# Patient Record
Sex: FEMALE | Race: WHITE | NOT HISPANIC OR LATINO | Employment: UNEMPLOYED | URBAN - METROPOLITAN AREA
[De-identification: names, ages, dates, MRNs, and addresses within clinical notes are randomized per-mention and may not be internally consistent; named-entity substitution may affect disease eponyms.]

---

## 2017-01-20 ENCOUNTER — TELEPHONE (OUTPATIENT)
Dept: INTERNAL MEDICINE | Facility: CLINIC | Age: 57
End: 2017-01-20

## 2017-01-20 NOTE — TELEPHONE ENCOUNTER
----- Message from Nydia Hameed sent at 1/20/2017 11:25 AM EST -----  Contact: BECKA ZAYAS PH:[phone]  BECKA ZAYAS CALLING FOR A REFILL FOR HER PERCOCET. SHE CAN BE REACHED [phone]

## 2017-01-23 RX ORDER — OXYCODONE AND ACETAMINOPHEN 7.5; 325 MG/1; MG/1
1 TABLET ORAL 4 TIMES DAILY PRN
Qty: 120 TABLET | Refills: 0 | Status: SHIPPED | OUTPATIENT
Start: 2017-01-23 | End: 2017-02-23 | Stop reason: SDUPTHER

## 2017-02-01 ENCOUNTER — TELEPHONE (OUTPATIENT)
Dept: INTERNAL MEDICINE | Facility: CLINIC | Age: 57
End: 2017-02-01

## 2017-02-01 ENCOUNTER — OFFICE VISIT (OUTPATIENT)
Dept: INTERNAL MEDICINE | Facility: CLINIC | Age: 57
End: 2017-02-01

## 2017-02-01 VITALS
HEART RATE: 64 BPM | RESPIRATION RATE: 16 BRPM | SYSTOLIC BLOOD PRESSURE: 130 MMHG | BODY MASS INDEX: 34.54 KG/M2 | DIASTOLIC BLOOD PRESSURE: 88 MMHG | WEIGHT: 195 LBS

## 2017-02-01 DIAGNOSIS — J06.9 URTI (ACUTE UPPER RESPIRATORY INFECTION): Primary | ICD-10-CM

## 2017-02-01 PROCEDURE — 96372 THER/PROPH/DIAG INJ SC/IM: CPT | Performed by: INTERNAL MEDICINE

## 2017-02-01 PROCEDURE — 99214 OFFICE O/P EST MOD 30 MIN: CPT | Performed by: INTERNAL MEDICINE

## 2017-02-01 RX ORDER — FLUCONAZOLE 150 MG/1
150 TABLET ORAL ONCE
Qty: 2 TABLET | Refills: 1 | Status: SHIPPED | OUTPATIENT
Start: 2017-02-01 | End: 2017-02-01

## 2017-02-01 RX ORDER — DEXAMETHASONE SODIUM PHOSPHATE 4 MG/ML
4 INJECTION, SOLUTION INTRA-ARTICULAR; INTRALESIONAL; INTRAMUSCULAR; INTRAVENOUS; SOFT TISSUE ONCE
Status: COMPLETED | OUTPATIENT
Start: 2017-02-01 | End: 2017-02-01

## 2017-02-01 RX ORDER — AMOXICILLIN AND CLAVULANATE POTASSIUM 875; 125 MG/1; MG/1
1 TABLET, FILM COATED ORAL 2 TIMES DAILY
Qty: 14 TABLET | Refills: 0 | Status: SHIPPED | OUTPATIENT
Start: 2017-02-01 | End: 2017-02-23

## 2017-02-01 RX ADMIN — DEXAMETHASONE SODIUM PHOSPHATE 4 MG: 4 INJECTION, SOLUTION INTRA-ARTICULAR; INTRALESIONAL; INTRAMUSCULAR; INTRAVENOUS; SOFT TISSUE at 15:49

## 2017-02-01 NOTE — TELEPHONE ENCOUNTER
Diflucan 150 mg PO Daily X 1 dose wait 72 hours and if symptoms have not resolved take second dose. #2 with 1 refill.

## 2017-02-01 NOTE — PROGRESS NOTES
"Subjective   Lauren Womack is a 56 y.o. female.   Pt is here with the acute complaint of URTI.           History of Present Illness   Pt states she has had URTI symptoms for 2 weeks. She has tried all OTC products including antihistamines, cough syrup and mucinex with non relief. She denies fever or chills. She admits to sinus congestion and drainage. She also states in the am she has noticed wheezing, as the day goes by she feels like her \"lungs clear out\" she feels all sinus congestion is \"draining into her lungs\" when she sleeps.           The following portions of the patient's history were reviewed and updated as appropriate: allergies, current medications, past family history, past medical history, past social history, past surgical history and problem list.         Review of Systems   Constitutional: Negative.    HENT:        See HPI.    Eyes: Negative.    Respiratory: Negative.    Cardiovascular: Negative.    Gastrointestinal: Negative.    Endocrine: Negative.    Genitourinary: Negative.    Musculoskeletal: Negative.    Skin: Negative.    Allergic/Immunologic: Negative.    Neurological: Negative.    Hematological: Negative.    Psychiatric/Behavioral: Negative.        Objective   Physical Exam   Constitutional: She is oriented to person, place, and time. She appears well-developed and well-nourished.   HENT:   Head: Normocephalic and atraumatic.   Nose: Nose normal.   Bilateral ear effusions.   Moderate oropharyngeal drainage with mild erythema.   Tenderness to palpation of maxillary sinuses.    Eyes: Conjunctivae and EOM are normal. Pupils are equal, round, and reactive to light.   Neck: Normal range of motion. Neck supple.   Cardiovascular: Normal rate, regular rhythm, normal heart sounds and intact distal pulses.    Pulmonary/Chest: Effort normal and breath sounds normal.   Occasional wheeze.   Neurological: She is alert and oriented to person, place, and time. She has normal reflexes.   Skin: Skin is " warm and dry.   Psychiatric: She has a normal mood and affect.   Nursing note and vitals reviewed.      Assessment/Plan      1.) Augmentin 875 mg PO BID X 7 days with no refills.   2.) Decadron 4 mg IM X 1 dose.   3.) Keep scheduled follow up   4.) 15 minutes spent on exam, 10 minutes spent counseling on new medications-how to dose and possible s/e.       * Total time spent in discussion and exam 25 minutes.

## 2017-02-01 NOTE — TELEPHONE ENCOUNTER
----- Message from Cesia Turcios sent at 2/1/2017  4:16 PM EST -----  Contact: Patient  Patient was just here and got rx for Augmentin.  Forgot that anytime she is on antibiotic she gets bad yeast infection and usually needs 2 Diflucan.  Wants to know if we can call this in to [facility] in [place].

## 2017-02-23 ENCOUNTER — OFFICE VISIT (OUTPATIENT)
Dept: INTERNAL MEDICINE | Facility: CLINIC | Age: 57
End: 2017-02-23

## 2017-02-23 VITALS — RESPIRATION RATE: 16 BRPM | WEIGHT: 195 LBS | HEART RATE: 64 BPM | BODY MASS INDEX: 34.54 KG/M2

## 2017-02-23 DIAGNOSIS — G47.09 OTHER INSOMNIA: ICD-10-CM

## 2017-02-23 DIAGNOSIS — M54.50 CHRONIC LOW BACK PAIN WITHOUT SCIATICA, UNSPECIFIED BACK PAIN LATERALITY: ICD-10-CM

## 2017-02-23 DIAGNOSIS — K58.9 IRRITABLE BOWEL SYNDROME WITHOUT DIARRHEA: ICD-10-CM

## 2017-02-23 DIAGNOSIS — M79.7 FIBROMYALGIA: ICD-10-CM

## 2017-02-23 DIAGNOSIS — G43.009 NONINTRACTABLE MIGRAINE, UNSPECIFIED MIGRAINE TYPE: ICD-10-CM

## 2017-02-23 DIAGNOSIS — M77.8 TENDONITIS OF FINGER: ICD-10-CM

## 2017-02-23 DIAGNOSIS — E78.5 HYPERLIPIDEMIA, UNSPECIFIED HYPERLIPIDEMIA TYPE: Primary | ICD-10-CM

## 2017-02-23 DIAGNOSIS — G89.29 CHRONIC LOW BACK PAIN WITHOUT SCIATICA, UNSPECIFIED BACK PAIN LATERALITY: ICD-10-CM

## 2017-02-23 PROBLEM — J06.9 URTI (ACUTE UPPER RESPIRATORY INFECTION): Status: RESOLVED | Noted: 2017-02-01 | Resolved: 2017-02-23

## 2017-02-23 LAB
ALBUMIN SERPL-MCNC: 4.7 G/DL (ref 3.2–4.8)
ALBUMIN/GLOB SERPL: 1.5 G/DL (ref 1.5–2.5)
ALP SERPL-CCNC: 51 U/L (ref 25–100)
ALT SERPL W P-5'-P-CCNC: 16 U/L (ref 7–40)
ANION GAP SERPL CALCULATED.3IONS-SCNC: 13 MMOL/L (ref 3–11)
ARTICHOKE IGE QN: 183 MG/DL (ref 0–130)
AST SERPL-CCNC: 27 U/L (ref 0–33)
BILIRUB SERPL-MCNC: 0.2 MG/DL (ref 0.3–1.2)
BUN BLD-MCNC: 9 MG/DL (ref 9–23)
BUN/CREAT SERPL: 15 (ref 7–25)
CALCIUM SPEC-SCNC: 9.8 MG/DL (ref 8.7–10.4)
CHLORIDE SERPL-SCNC: 105 MMOL/L (ref 99–109)
CHOLEST SERPL-MCNC: 248 MG/DL (ref 0–200)
CO2 SERPL-SCNC: 21 MMOL/L (ref 20–31)
CREAT BLD-MCNC: 0.6 MG/DL (ref 0.6–1.3)
DEPRECATED RDW RBC AUTO: 45.1 FL (ref 37–54)
ERYTHROCYTE [DISTWIDTH] IN BLOOD BY AUTOMATED COUNT: 13.6 % (ref 11.3–14.5)
GFR SERPL CREATININE-BSD FRML MDRD: 104 ML/MIN/1.73
GLOBULIN UR ELPH-MCNC: 3.2 GM/DL
GLUCOSE BLD-MCNC: 90 MG/DL (ref 70–100)
HCT VFR BLD AUTO: 43.8 % (ref 34.5–44)
HDLC SERPL-MCNC: 59 MG/DL (ref 40–60)
HGB BLD-MCNC: 14.2 G/DL (ref 11.5–15.5)
MCH RBC QN AUTO: 29.2 PG (ref 27–31)
MCHC RBC AUTO-ENTMCNC: 32.4 G/DL (ref 32–36)
MCV RBC AUTO: 90.1 FL (ref 80–99)
PLATELET # BLD AUTO: 304 10*3/MM3 (ref 150–450)
PMV BLD AUTO: 11.8 FL (ref 6–12)
POTASSIUM BLD-SCNC: 4.3 MMOL/L (ref 3.5–5.5)
PROT SERPL-MCNC: 7.9 G/DL (ref 5.7–8.2)
RBC # BLD AUTO: 4.86 10*6/MM3 (ref 3.89–5.14)
SODIUM BLD-SCNC: 139 MMOL/L (ref 132–146)
TRIGL SERPL-MCNC: 152 MG/DL (ref 0–150)
WBC NRBC COR # BLD: 8.81 10*3/MM3 (ref 3.5–10.8)

## 2017-02-23 PROCEDURE — 80053 COMPREHEN METABOLIC PANEL: CPT | Performed by: INTERNAL MEDICINE

## 2017-02-23 PROCEDURE — 36415 COLL VENOUS BLD VENIPUNCTURE: CPT | Performed by: INTERNAL MEDICINE

## 2017-02-23 PROCEDURE — 80061 LIPID PANEL: CPT | Performed by: INTERNAL MEDICINE

## 2017-02-23 PROCEDURE — 85027 COMPLETE CBC AUTOMATED: CPT | Performed by: INTERNAL MEDICINE

## 2017-02-23 PROCEDURE — 99214 OFFICE O/P EST MOD 30 MIN: CPT | Performed by: INTERNAL MEDICINE

## 2017-02-23 RX ORDER — TRIAMCINOLONE ACETONIDE 55 UG/1
2 SPRAY, METERED NASAL DAILY
Qty: 1 BOTTLE | Refills: 5 | Status: SHIPPED | OUTPATIENT
Start: 2017-02-23

## 2017-02-23 RX ORDER — CARISOPRODOL 250 MG/1
1 TABLET ORAL DAILY PRN
Qty: 30 TABLET | Refills: 0 | Status: SHIPPED | OUTPATIENT
Start: 2017-02-23 | End: 2017-06-01 | Stop reason: SDUPTHER

## 2017-02-23 RX ORDER — PREGABALIN 225 MG/1
225 CAPSULE ORAL 2 TIMES DAILY
Qty: 60 CAPSULE | Refills: 3 | Status: SHIPPED | OUTPATIENT
Start: 2017-02-23 | End: 2017-04-21 | Stop reason: SDUPTHER

## 2017-02-23 RX ORDER — TRIAMCINOLONE ACETONIDE 55 UG/1
2 SPRAY, METERED NASAL DAILY
COMMUNITY
End: 2017-02-23 | Stop reason: SDUPTHER

## 2017-02-23 RX ORDER — OXYCODONE AND ACETAMINOPHEN 7.5; 325 MG/1; MG/1
1 TABLET ORAL 4 TIMES DAILY PRN
Qty: 120 TABLET | Refills: 0 | Status: SHIPPED | OUTPATIENT
Start: 2017-02-23 | End: 2017-03-22 | Stop reason: SDUPTHER

## 2017-02-23 RX ORDER — CLONAZEPAM 1 MG/1
1 TABLET ORAL NIGHTLY PRN
Qty: 30 TABLET | Refills: 0 | Status: SHIPPED | OUTPATIENT
Start: 2017-02-23 | End: 2017-04-21 | Stop reason: SDUPTHER

## 2017-02-23 NOTE — PROGRESS NOTES
Subjective   Lauren Womack is a 56 y.o. female.   Pt is here to follow up on fibromyalgia,insomnia,HPL,migraines,IBS,and chronic low back pain.          History of Present Illness   Pt is here to follow up on fibromyalgia,insomnia,HPL,migraines,IBS,and chronic low back pain. She states that all chronic issues are doing well. She does request to switch her Flexeril to a different muscle relaxer, she only takes this once daily when needed. In the past Soma worked the best and did not make her sleepy during the day.   She has noticed over the last week she had developed a pain in her left thumb especially when trying to pick things up, she states when she tries lift an object or twist a lid she will almost drop whatever is in her hand due to the sharp pain. She denies repetitive activity with her left hand.               The following portions of the patient's history were reviewed and updated as appropriate: allergies, current medications, past family history, past medical history, past social history, past surgical history and problem list.             Review of Systems   Constitutional: Negative.    HENT: Negative.    Eyes: Negative.    Respiratory: Negative.    Cardiovascular:        See HPI.    Gastrointestinal:        See HPI.    Endocrine: Negative.    Genitourinary: Negative.    Musculoskeletal:        See HPI.    Skin: Negative.    Allergic/Immunologic: Negative.    Neurological:        See HPI.    Hematological: Negative.    Psychiatric/Behavioral:        See HPI.               Objective   Physical Exam   Constitutional: She is oriented to person, place, and time. She appears well-developed and well-nourished.   HENT:   Head: Normocephalic and atraumatic.   Right Ear: External ear normal.   Left Ear: External ear normal.   Nose: Nose normal.   Mouth/Throat: Oropharynx is clear and moist.   Eyes: Conjunctivae and EOM are normal. Pupils are equal, round, and reactive to light.   Neck: Normal range of motion.  Neck supple. No tracheal deviation present. No thyromegaly present.   Cardiovascular: Normal rate, regular rhythm, normal heart sounds and intact distal pulses.    Pulmonary/Chest: Effort normal and breath sounds normal.   Abdominal: Soft. Bowel sounds are normal. She exhibits no distension. There is no tenderness.   Neurological: She is alert and oriented to person, place, and time. She has normal reflexes.   Skin: Skin is warm and dry.   Psychiatric: She has a normal mood and affect.   Nursing note and vitals reviewed.              Assessment/Plan     Hyperlipidemia, unspecified hyperlipidemia type  -     Comprehensive Metabolic Panel  -     Lipid Panel  -     CBC (No Diff)    Fibromyalgia  -     pregabalin (LYRICA) 225 MG capsule; Take 1 capsule by mouth 2 (Two) Times a Day.    Other insomnia  -     clonazePAM (KlonoPIN) 1 MG tablet; Take 1 tablet by mouth At Night As Needed for seizures.    Nonintractable migraine, unspecified migraine type    Irritable bowel syndrome without diarrhea    Chronic low back pain without sciatica, unspecified back pain laterality    Tendonitis of finger    Other orders  -     oxyCODONE-acetaminophen (PERCOCET) 7.5-325 MG per tablet; Take 1 tablet by mouth 4 (Four) Times a Day As Needed for moderate pain (4-6).  -     Triamcinolone Acetonide (NASACORT AQ) 55 MCG/ACT nasal inhaler; 2 sprays into each nostril Daily.  -     carisoprodol (SOMA) 250 MG tablet; Take 1 tablet by mouth Daily As Needed for muscle spasms.          1.) CBC,CMP,lipid panel   2.) Will do UDS at next visit ( It cost $50 every time, her last was in 11/2016, all have been optimal).   3.) Stop flexeril, start Soma 250 mg PO daily prn, how to dose and possible s/e discussed.   4.) left hand brace for tendonitis to left thumb.   5.) Follow up in 3 months

## 2017-02-24 RX ORDER — ATORVASTATIN CALCIUM 20 MG/1
20 TABLET, FILM COATED ORAL DAILY
Qty: 30 TABLET | Refills: 3 | Status: SHIPPED | OUTPATIENT
Start: 2017-02-24 | End: 2017-09-18 | Stop reason: SDUPTHER

## 2017-03-20 RX ORDER — RIZATRIPTAN BENZOATE 10 MG/1
TABLET ORAL
Qty: 10 TABLET | Refills: 0 | Status: SHIPPED | OUTPATIENT
Start: 2017-03-20 | End: 2017-04-18 | Stop reason: SDUPTHER

## 2017-03-23 RX ORDER — OXYCODONE AND ACETAMINOPHEN 7.5; 325 MG/1; MG/1
1 TABLET ORAL 4 TIMES DAILY PRN
Qty: 120 TABLET | Refills: 0 | Status: SHIPPED | OUTPATIENT
Start: 2017-03-23 | End: 2017-04-21 | Stop reason: SDUPTHER

## 2017-04-06 DIAGNOSIS — Z00.00 HEALTH CARE MAINTENANCE: ICD-10-CM

## 2017-04-13 DIAGNOSIS — J06.9 URTI (ACUTE UPPER RESPIRATORY INFECTION): Primary | ICD-10-CM

## 2017-04-13 RX ORDER — FLUCONAZOLE 150 MG/1
150 TABLET ORAL ONCE
Qty: 1 TABLET | Refills: 1 | Status: SHIPPED | OUTPATIENT
Start: 2017-04-13 | End: 2017-04-13

## 2017-04-13 RX ORDER — AZITHROMYCIN 250 MG/1
TABLET, FILM COATED ORAL
Qty: 6 TABLET | Refills: 0 | Status: SHIPPED | OUTPATIENT
Start: 2017-04-13 | End: 2017-06-01

## 2017-04-17 ENCOUNTER — TELEPHONE (OUTPATIENT)
Dept: INTERNAL MEDICINE | Facility: CLINIC | Age: 57
End: 2017-04-17

## 2017-04-17 DIAGNOSIS — Z00.00 HEALTH CARE MAINTENANCE: ICD-10-CM

## 2017-04-17 RX ORDER — ESTRADIOL 0.1 MG/D
1 FILM, EXTENDED RELEASE TRANSDERMAL 2 TIMES WEEKLY
Qty: 24 PATCH | Refills: 2 | Status: SHIPPED | OUTPATIENT
Start: 2017-04-17 | End: 2017-07-16

## 2017-04-17 NOTE — TELEPHONE ENCOUNTER
----- Message from Amy Blanco sent at 4/17/2017  2:56 PM EDT -----  PT CALLING  STATED THAT HAS NOT RECEIVED  VIVELLE. 0.1MG   PATCH.  WANTING   3 MTH SUPPLY  STATED THAT SWITCHED TO NEW PHARMACY MAIL ORDER AND  NEED VERIFICATION FROM     SHE CAN BE REACHED [phone]    [facility] MAIL ORDER

## 2017-04-18 RX ORDER — RIZATRIPTAN BENZOATE 10 MG/1
TABLET ORAL
Qty: 10 TABLET | Refills: 0 | Status: SHIPPED | OUTPATIENT
Start: 2017-04-18 | End: 2017-05-22 | Stop reason: SDUPTHER

## 2017-04-21 DIAGNOSIS — M79.7 FIBROMYALGIA: ICD-10-CM

## 2017-04-21 DIAGNOSIS — G47.09 OTHER INSOMNIA: ICD-10-CM

## 2017-04-21 RX ORDER — PREGABALIN 225 MG/1
225 CAPSULE ORAL 2 TIMES DAILY
Qty: 60 CAPSULE | Refills: 3 | Status: SHIPPED | OUTPATIENT
Start: 2017-04-21 | End: 2017-06-01 | Stop reason: SDUPTHER

## 2017-04-21 RX ORDER — OXYCODONE AND ACETAMINOPHEN 7.5; 325 MG/1; MG/1
1 TABLET ORAL 4 TIMES DAILY PRN
Qty: 120 TABLET | Refills: 0 | Status: SHIPPED | OUTPATIENT
Start: 2017-04-21 | End: 2017-05-23 | Stop reason: SDUPTHER

## 2017-04-21 RX ORDER — CLONAZEPAM 1 MG/1
1 TABLET ORAL NIGHTLY PRN
Qty: 30 TABLET | Refills: 0 | Status: SHIPPED | OUTPATIENT
Start: 2017-04-21 | End: 2017-06-08 | Stop reason: SDUPTHER

## 2017-04-21 NOTE — TELEPHONE ENCOUNTER
----- Message from Amy Blanco sent at 4/21/2017 11:09 AM EDT -----  PT CALLING  FOR RX REFILL OF PERCOCET. WHICH SHE STATED THAT SHE DID NOT NEED UNTIL Monday(4/24/17)   ALSO, RX REFILL OF LYRICA AND CLONAZEPAM    CONTACT [phone]    PHARM [facility] [address] IN [place]

## 2017-05-23 ENCOUNTER — TELEPHONE (OUTPATIENT)
Dept: INTERNAL MEDICINE | Facility: CLINIC | Age: 57
End: 2017-05-23

## 2017-05-23 RX ORDER — OXYCODONE AND ACETAMINOPHEN 7.5; 325 MG/1; MG/1
1 TABLET ORAL 4 TIMES DAILY PRN
Qty: 120 TABLET | Refills: 0 | Status: SHIPPED | OUTPATIENT
Start: 2017-05-23 | End: 2017-06-01 | Stop reason: SDUPTHER

## 2017-05-23 RX ORDER — RIZATRIPTAN BENZOATE 10 MG/1
TABLET ORAL
Qty: 9 TABLET | Refills: 0 | Status: SHIPPED | OUTPATIENT
Start: 2017-05-23 | End: 2017-06-01 | Stop reason: SDUPTHER

## 2017-06-01 ENCOUNTER — OFFICE VISIT (OUTPATIENT)
Dept: INTERNAL MEDICINE | Facility: CLINIC | Age: 57
End: 2017-06-01

## 2017-06-01 VITALS
SYSTOLIC BLOOD PRESSURE: 128 MMHG | HEART RATE: 70 BPM | RESPIRATION RATE: 18 BRPM | DIASTOLIC BLOOD PRESSURE: 76 MMHG | BODY MASS INDEX: 32.99 KG/M2 | OXYGEN SATURATION: 96 % | WEIGHT: 186.2 LBS | HEIGHT: 63 IN

## 2017-06-01 DIAGNOSIS — E78.5 HYPERLIPIDEMIA, UNSPECIFIED HYPERLIPIDEMIA TYPE: Primary | ICD-10-CM

## 2017-06-01 DIAGNOSIS — K58.9 IRRITABLE BOWEL SYNDROME WITHOUT DIARRHEA: ICD-10-CM

## 2017-06-01 DIAGNOSIS — G47.09 OTHER INSOMNIA: ICD-10-CM

## 2017-06-01 DIAGNOSIS — M79.7 FIBROMYALGIA: ICD-10-CM

## 2017-06-01 DIAGNOSIS — F41.9 ANXIETY: ICD-10-CM

## 2017-06-01 DIAGNOSIS — M54.50 CHRONIC LOW BACK PAIN WITHOUT SCIATICA, UNSPECIFIED BACK PAIN LATERALITY: ICD-10-CM

## 2017-06-01 DIAGNOSIS — G89.29 CHRONIC LOW BACK PAIN WITHOUT SCIATICA, UNSPECIFIED BACK PAIN LATERALITY: ICD-10-CM

## 2017-06-01 LAB
ALBUMIN SERPL-MCNC: 4.6 G/DL (ref 3.2–4.8)
ALBUMIN/GLOB SERPL: 1.5 G/DL (ref 1.5–2.5)
ALP SERPL-CCNC: 48 U/L (ref 25–100)
ALT SERPL W P-5'-P-CCNC: 14 U/L (ref 7–40)
ANION GAP SERPL CALCULATED.3IONS-SCNC: 3 MMOL/L (ref 3–11)
ARTICHOKE IGE QN: 122 MG/DL (ref 0–130)
AST SERPL-CCNC: 23 U/L (ref 0–33)
BILIRUB SERPL-MCNC: 0.4 MG/DL (ref 0.3–1.2)
BUN BLD-MCNC: 8 MG/DL (ref 9–23)
BUN/CREAT SERPL: 11.4 (ref 7–25)
CALCIUM SPEC-SCNC: 9.9 MG/DL (ref 8.7–10.4)
CHLORIDE SERPL-SCNC: 105 MMOL/L (ref 99–109)
CHOLEST SERPL-MCNC: 183 MG/DL (ref 0–200)
CO2 SERPL-SCNC: 30 MMOL/L (ref 20–31)
CREAT BLD-MCNC: 0.7 MG/DL (ref 0.6–1.3)
DEPRECATED RDW RBC AUTO: 47 FL (ref 37–54)
ERYTHROCYTE [DISTWIDTH] IN BLOOD BY AUTOMATED COUNT: 14.1 % (ref 11.3–14.5)
GFR SERPL CREATININE-BSD FRML MDRD: 87 ML/MIN/1.73
GLOBULIN UR ELPH-MCNC: 3 GM/DL
GLUCOSE BLD-MCNC: 64 MG/DL (ref 70–100)
HCT VFR BLD AUTO: 43.4 % (ref 34.5–44)
HDLC SERPL-MCNC: 55 MG/DL (ref 40–60)
HGB BLD-MCNC: 13.8 G/DL (ref 11.5–15.5)
MCH RBC QN AUTO: 28.9 PG (ref 27–31)
MCHC RBC AUTO-ENTMCNC: 31.8 G/DL (ref 32–36)
MCV RBC AUTO: 91 FL (ref 80–99)
PLATELET # BLD AUTO: 287 10*3/MM3 (ref 150–450)
PMV BLD AUTO: 11.9 FL (ref 6–12)
POTASSIUM BLD-SCNC: 4.3 MMOL/L (ref 3.5–5.5)
PROT SERPL-MCNC: 7.6 G/DL (ref 5.7–8.2)
RBC # BLD AUTO: 4.77 10*6/MM3 (ref 3.89–5.14)
SODIUM BLD-SCNC: 138 MMOL/L (ref 132–146)
TRIGL SERPL-MCNC: 105 MG/DL (ref 0–150)
WBC NRBC COR # BLD: 8.38 10*3/MM3 (ref 3.5–10.8)

## 2017-06-01 PROCEDURE — 80053 COMPREHEN METABOLIC PANEL: CPT | Performed by: INTERNAL MEDICINE

## 2017-06-01 PROCEDURE — 80061 LIPID PANEL: CPT | Performed by: INTERNAL MEDICINE

## 2017-06-01 PROCEDURE — 85027 COMPLETE CBC AUTOMATED: CPT | Performed by: INTERNAL MEDICINE

## 2017-06-01 PROCEDURE — 99214 OFFICE O/P EST MOD 30 MIN: CPT | Performed by: INTERNAL MEDICINE

## 2017-06-01 RX ORDER — PREGABALIN 225 MG/1
225 CAPSULE ORAL 2 TIMES DAILY
Qty: 60 CAPSULE | Refills: 3 | Status: SHIPPED | OUTPATIENT
Start: 2017-06-01 | End: 2017-09-18 | Stop reason: SDUPTHER

## 2017-06-01 RX ORDER — FLUOXETINE 10 MG/1
10 CAPSULE ORAL DAILY
Qty: 30 CAPSULE | Refills: 3 | Status: SHIPPED | OUTPATIENT
Start: 2017-06-01 | End: 2017-07-14 | Stop reason: SINTOL

## 2017-06-01 RX ORDER — PROMETHAZINE HYDROCHLORIDE 12.5 MG/1
12.5 TABLET ORAL EVERY 8 HOURS PRN
Qty: 30 TABLET | Refills: 1 | Status: SHIPPED | OUTPATIENT
Start: 2017-06-01

## 2017-06-01 RX ORDER — CELECOXIB 200 MG/1
200 CAPSULE ORAL DAILY
Qty: 60 CAPSULE | Refills: 4 | Status: SHIPPED | OUTPATIENT
Start: 2017-06-01 | End: 2017-09-18 | Stop reason: SDUPTHER

## 2017-06-01 RX ORDER — OXYCODONE AND ACETAMINOPHEN 7.5; 325 MG/1; MG/1
1 TABLET ORAL 4 TIMES DAILY PRN
Qty: 120 TABLET | Refills: 0 | Status: SHIPPED | OUTPATIENT
Start: 2017-06-01 | End: 2017-07-03 | Stop reason: SDUPTHER

## 2017-06-01 RX ORDER — CARISOPRODOL 250 MG/1
1 TABLET ORAL DAILY PRN
Qty: 30 TABLET | Refills: 0 | Status: SHIPPED | OUTPATIENT
Start: 2017-06-01 | End: 2018-01-03 | Stop reason: SDUPTHER

## 2017-06-01 RX ORDER — RIZATRIPTAN BENZOATE 10 MG/1
10 TABLET ORAL ONCE AS NEEDED
Qty: 9 TABLET | Refills: 1 | Status: SHIPPED | OUTPATIENT
Start: 2017-06-01 | End: 2017-09-18 | Stop reason: SDUPTHER

## 2017-06-01 NOTE — PROGRESS NOTES
Subjective   Lauren Womack is a 56 y.o. female.   Pt is here to follow up on HPL,IBS,chronic low back pain,fibromyalgia and insomnia.       History of Present Illness   Pt is here to follow up on HPL,IBS,chronic low back pain,fibromyalgia and insomnia.   Pt states that all chronic issues are doing well.   She has noticed as of late that she has been having more panic attacks during the day, she does not take anything daily for anxiety but does take klonopin at night prn for sleep, she does not want to use this during the day if she can help it. Years ago she tried Buspar and Prozac both worked for panic attacks.     The following portions of the patient's history were reviewed and updated as appropriate: allergies, current medications, past family history, past medical history, past social history, past surgical history and problem list.          Review of Systems   Cardiovascular:        See HPI.    Gastrointestinal:        See HPI.    Endocrine: Negative.    Genitourinary: Negative.    Musculoskeletal:        See HPI.    Skin: Negative.    Allergic/Immunologic: Negative.    Neurological: Negative.    Hematological: Negative.    Psychiatric/Behavioral:        See HPI.               Objective   Physical Exam   Constitutional: She is oriented to person, place, and time. She appears well-developed and well-nourished.   HENT:   Head: Normocephalic and atraumatic.   Right Ear: External ear normal.   Left Ear: External ear normal.   Nose: Nose normal.   Mouth/Throat: Oropharynx is clear and moist.   Eyes: Conjunctivae and EOM are normal. Pupils are equal, round, and reactive to light.   Neck: Normal range of motion. Neck supple. No tracheal deviation present. No thyromegaly present.   Cardiovascular: Normal rate, regular rhythm, normal heart sounds and intact distal pulses.    Pulmonary/Chest: Effort normal and breath sounds normal.   Abdominal: Soft. Bowel sounds are normal. She exhibits no distension. There is no  tenderness.   Neurological: She is alert and oriented to person, place, and time. She has normal reflexes.   Skin: Skin is warm and dry.   Psychiatric: She has a normal mood and affect.   Nursing note and vitals reviewed.             Assessment/Plan    Hyperlipidemia, unspecified hyperlipidemia type  -     Comprehensive Metabolic Panel  -     Lipid Panel  -     CBC (No Diff)    Irritable bowel syndrome without diarrhea    Chronic low back pain without sciatica, unspecified back pain laterality    Fibromyalgia  -     pregabalin (LYRICA) 225 MG capsule; Take 1 capsule by mouth 2 (Two) Times a Day.  -     celecoxib (CELEBREX) 200 MG capsule; Take 1 capsule by mouth Daily.    Other insomnia  -     Urine Drug Screen    Anxiety  -     FLUoxetine (PROZAC) 10 MG capsule; Take 1 capsule by mouth Daily.    Other orders  -     oxyCODONE-acetaminophen (PERCOCET) 7.5-325 MG per tablet; Take 1 tablet by mouth 4 (Four) Times a Day As Needed for Moderate Pain (4-6).  -     rizatriptan (MAXALT) 10 MG tablet; Take 1 tablet by mouth 1 (One) Time As Needed for Migraine for up to 1 dose. May repeat in 2 hours if needed  -     carisoprodol (SOMA) 250 MG tablet; Take 1 tablet by mouth Daily As Needed for Muscle Spasms.  -     promethazine (PHENERGAN) 12.5 MG tablet; Take 1 tablet by mouth Every 8 (Eight) Hours As Needed for Nausea or Vomiting.      1.) Check CBC,CMP,lipid panel and UDS   2.) Follow up in 3 months   3.) [id number] mg PO Daily how to dose and possible s/e discussed. Call in 2 weeks to let me know how she is doing as it is hard for her to get here in less than 3 months time.   4.) Refill chronic meds.

## 2017-06-07 ENCOUNTER — TELEPHONE (OUTPATIENT)
Dept: INTERNAL MEDICINE | Facility: CLINIC | Age: 57
End: 2017-06-07

## 2017-06-07 NOTE — TELEPHONE ENCOUNTER
----- Message from Amy Blanco sent at 6/7/2017 12:31 PM EDT -----  Pt calling needing copy of medical records for disability reasons.  Wanted to know if  could get copy. I informed that pt would need to come in and sign for the release and could be up to 30 days. But needs them asap wanted to speak to someone not sure what else to inform them to do.      Pt [phone] house

## 2017-06-07 NOTE — TELEPHONE ENCOUNTER
I called patient and discussed process with her.  Per her request I mailed JEROME to home address and she will have  bring it by here.  Informed her I would have raymond it ASAP for CIOX but it has to be processed by them and mailed to her and could still take up to 2 weeks.  She verbalized understanding.

## 2017-06-08 ENCOUNTER — TELEPHONE (OUTPATIENT)
Dept: INTERNAL MEDICINE | Facility: CLINIC | Age: 57
End: 2017-06-08

## 2017-06-08 DIAGNOSIS — G47.09 OTHER INSOMNIA: ICD-10-CM

## 2017-06-08 RX ORDER — CLONAZEPAM 1 MG/1
1 TABLET ORAL NIGHTLY PRN
Qty: 30 TABLET | Refills: 0 | OUTPATIENT
Start: 2017-06-08 | End: 2017-07-05 | Stop reason: SDUPTHER

## 2017-06-08 NOTE — TELEPHONE ENCOUNTER
----- Message from Nila Serrano sent at 6/8/2017 10:46 AM EDT -----  [phone]  REFILL ON [address]   CALL WHEN READY FOR

## 2017-07-03 ENCOUNTER — TELEPHONE (OUTPATIENT)
Dept: INTERNAL MEDICINE | Facility: CLINIC | Age: 57
End: 2017-07-03

## 2017-07-03 DIAGNOSIS — G47.09 OTHER INSOMNIA: ICD-10-CM

## 2017-07-03 RX ORDER — OXYCODONE AND ACETAMINOPHEN 7.5; 325 MG/1; MG/1
1 TABLET ORAL 4 TIMES DAILY PRN
Qty: 120 TABLET | Refills: 0 | Status: SHIPPED | OUTPATIENT
Start: 2017-07-03 | End: 2017-08-03 | Stop reason: SDUPTHER

## 2017-07-03 NOTE — TELEPHONE ENCOUNTER
----- Message from Diana Walsh sent at 7/3/2017  2:28 PM EDT -----  PT CALLED NEEDING REFILL ON RX KLONOPIN AND PERCOCET  SHE CAN BE REACHED [phone]    PHARMACY- [facility]

## 2017-07-05 RX ORDER — CLONAZEPAM 1 MG/1
1 TABLET ORAL NIGHTLY PRN
Qty: 30 TABLET | Refills: 0 | OUTPATIENT
Start: 2017-07-05 | End: 2017-08-03 | Stop reason: SDUPTHER

## 2017-07-05 NOTE — TELEPHONE ENCOUNTER
Rx called in to pharm [facility]. Rx for Precocet placed u front for p/u. Pt informed. Verbal great appreciation received.

## 2017-07-07 ENCOUNTER — TELEPHONE (OUTPATIENT)
Dept: INTERNAL MEDICINE | Facility: CLINIC | Age: 57
End: 2017-07-07

## 2017-07-07 NOTE — TELEPHONE ENCOUNTER
IF it is the Klonopin 1mg she is talking about trying taking 1/2 tablet instead 1 tablet and if this does not work call me on Monday and we will try something different.

## 2017-07-07 NOTE — TELEPHONE ENCOUNTER
----- Message from Genet Lewis sent at 7/7/2017  1:19 PM EDT -----      ----- Message -----     From: Diana Walsh     Sent: 7/7/2017   8:43 AM       To: Genet Lewis    PT CALLED AND STATED THAT HER NEW MEDICATION FOR PANIC ATTACKS MAKES HER SICK TO HER STOMACH. SHE WANTED TO KNOW WHAT ELSE TO DO? SHE CAN BE REACHED [phone]      [facility]

## 2017-07-07 NOTE — TELEPHONE ENCOUNTER
----- Message from Diana Walsh sent at 7/7/2017  8:43 AM EDT -----  PT CALLED AND STATED THAT HER NEW MEDICATION FOR PANIC ATTACKS MAKES HER SICK TO HER STOMACH. SHE WANTED TO KNOW WHAT ELSE TO DO? SHE CAN BE REACHED [phone]      [facility]

## 2017-07-14 DIAGNOSIS — F41.9 ANXIETY: Primary | ICD-10-CM

## 2017-07-14 RX ORDER — BUSPIRONE HYDROCHLORIDE 5 MG/1
5 TABLET ORAL DAILY PRN
Qty: 30 TABLET | Refills: 0 | Status: SHIPPED | OUTPATIENT
Start: 2017-07-14 | End: 2017-09-18 | Stop reason: SDUPTHER

## 2017-07-14 NOTE — TELEPHONE ENCOUNTER
Talked to patient, informed her that Dr. Aguilera wants her to stop the prozac, Buspar 5mg po daily PRN was sent in to pharmacy.

## 2017-08-03 ENCOUNTER — TELEPHONE (OUTPATIENT)
Dept: INTERNAL MEDICINE | Facility: CLINIC | Age: 57
End: 2017-08-03

## 2017-08-03 DIAGNOSIS — G47.09 OTHER INSOMNIA: ICD-10-CM

## 2017-08-03 RX ORDER — CLONAZEPAM 1 MG/1
1 TABLET ORAL NIGHTLY PRN
Qty: 30 TABLET | Refills: 0 | OUTPATIENT
Start: 2017-08-03 | End: 2017-08-03 | Stop reason: SDUPTHER

## 2017-08-03 RX ORDER — CLONAZEPAM 1 MG/1
1 TABLET ORAL NIGHTLY PRN
Qty: 30 TABLET | Refills: 0 | Status: SHIPPED | OUTPATIENT
Start: 2017-08-03 | End: 2017-09-18 | Stop reason: SDUPTHER

## 2017-08-03 RX ORDER — OXYCODONE AND ACETAMINOPHEN 7.5; 325 MG/1; MG/1
1 TABLET ORAL 4 TIMES DAILY PRN
Qty: 120 TABLET | Refills: 0 | Status: SHIPPED | OUTPATIENT
Start: 2017-08-03 | End: 2017-08-31 | Stop reason: SDUPTHER

## 2017-08-03 NOTE — TELEPHONE ENCOUNTER
----- Message from Olena Michael sent at 8/3/2017 10:37 AM EDT -----  PT NEEDS REFILL ON KLONOPIN AND PERCOCET    PH[facility]ACY: [facility]    PATIENT: [phone]

## 2017-08-03 NOTE — TELEPHONE ENCOUNTER
Contacted pt and explained her Rx were at the front for  and to bring ID. Pt stated she understood.

## 2017-08-29 ENCOUNTER — HOSPITAL ENCOUNTER (OUTPATIENT)
Dept: GENERAL RADIOLOGY | Facility: HOSPITAL | Age: 57
Discharge: HOME | End: 2017-08-29
Admitting: NURSE PRACTITIONER

## 2017-08-29 ENCOUNTER — OFFICE VISIT (OUTPATIENT)
Dept: INTERNAL MEDICINE | Facility: CLINIC | Age: 57
End: 2017-08-29

## 2017-08-29 VITALS — BODY MASS INDEX: 33.13 KG/M2 | HEART RATE: 84 BPM | RESPIRATION RATE: 18 BRPM | TEMPERATURE: 97.8 F | WEIGHT: 187 LBS

## 2017-08-29 DIAGNOSIS — M25.562 LATERAL KNEE PAIN, LEFT: ICD-10-CM

## 2017-08-29 DIAGNOSIS — M79.672 LEFT FOOT PAIN: ICD-10-CM

## 2017-08-29 DIAGNOSIS — M79.672 LEFT FOOT PAIN: Primary | ICD-10-CM

## 2017-08-29 PROCEDURE — 73630 X-RAY EXAM OF FOOT: CPT

## 2017-08-29 PROCEDURE — 99214 OFFICE O/P EST MOD 30 MIN: CPT | Performed by: NURSE PRACTITIONER

## 2017-08-29 RX ORDER — METHYLPREDNISOLONE 4 MG/1
TABLET ORAL
Qty: 21 TABLET | Refills: 0 | Status: SHIPPED | OUTPATIENT
Start: 2017-08-29 | End: 2017-09-18

## 2017-08-31 DIAGNOSIS — M72.2 PLANTAR FASCIITIS: Primary | ICD-10-CM

## 2017-08-31 RX ORDER — OXYCODONE AND ACETAMINOPHEN 7.5; 325 MG/1; MG/1
1 TABLET ORAL 4 TIMES DAILY PRN
Qty: 120 TABLET | Refills: 0 | Status: CANCELLED | OUTPATIENT
Start: 2017-08-31

## 2017-08-31 RX ORDER — OXYCODONE AND ACETAMINOPHEN 7.5; 325 MG/1; MG/1
1 TABLET ORAL 4 TIMES DAILY PRN
Qty: 120 TABLET | Refills: 0 | Status: SHIPPED | OUTPATIENT
Start: 2017-08-31 | End: 2017-09-18 | Stop reason: SDUPTHER

## 2017-09-05 ENCOUNTER — TELEPHONE (OUTPATIENT)
Dept: INTERNAL MEDICINE | Facility: CLINIC | Age: 57
End: 2017-09-05

## 2017-09-05 NOTE — TELEPHONE ENCOUNTER
PT IS WANTING A REFERRAL TO [facility]. STATES SHE HAS HAD [facility]ASCIT[facility]. OK TO SET UP REFERRAL

## 2017-09-18 ENCOUNTER — OFFICE VISIT (OUTPATIENT)
Dept: INTERNAL MEDICINE | Facility: CLINIC | Age: 57
End: 2017-09-18

## 2017-09-18 VITALS
DIASTOLIC BLOOD PRESSURE: 82 MMHG | RESPIRATION RATE: 20 BRPM | BODY MASS INDEX: 33.48 KG/M2 | WEIGHT: 189 LBS | HEART RATE: 78 BPM | SYSTOLIC BLOOD PRESSURE: 118 MMHG | TEMPERATURE: 97.9 F

## 2017-09-18 DIAGNOSIS — F41.9 ANXIETY: ICD-10-CM

## 2017-09-18 DIAGNOSIS — K58.9 IRRITABLE BOWEL SYNDROME WITHOUT DIARRHEA: ICD-10-CM

## 2017-09-18 DIAGNOSIS — G47.09 OTHER INSOMNIA: ICD-10-CM

## 2017-09-18 DIAGNOSIS — M79.7 FIBROMYALGIA: ICD-10-CM

## 2017-09-18 DIAGNOSIS — M54.50 CHRONIC LOW BACK PAIN WITHOUT SCIATICA, UNSPECIFIED BACK PAIN LATERALITY: ICD-10-CM

## 2017-09-18 DIAGNOSIS — G43.009 NONINTRACTABLE MIGRAINE, UNSPECIFIED MIGRAINE TYPE: ICD-10-CM

## 2017-09-18 DIAGNOSIS — G89.29 CHRONIC LOW BACK PAIN WITHOUT SCIATICA, UNSPECIFIED BACK PAIN LATERALITY: ICD-10-CM

## 2017-09-18 DIAGNOSIS — E78.5 HYPERLIPIDEMIA, UNSPECIFIED HYPERLIPIDEMIA TYPE: Primary | ICD-10-CM

## 2017-09-18 PROCEDURE — 99214 OFFICE O/P EST MOD 30 MIN: CPT | Performed by: INTERNAL MEDICINE

## 2017-09-18 RX ORDER — METOPROLOL TARTRATE 50 MG/1
50 TABLET, FILM COATED ORAL DAILY
Qty: 30 TABLET | Refills: 3 | Status: SHIPPED | OUTPATIENT
Start: 2017-09-18

## 2017-09-18 RX ORDER — ESTRADIOL 0.1 MG/D
1 FILM, EXTENDED RELEASE TRANSDERMAL 2 TIMES WEEKLY
Qty: 8 PATCH | Refills: 5 | Status: SHIPPED | OUTPATIENT
Start: 2017-09-18 | End: 2017-10-18

## 2017-09-18 RX ORDER — PREGABALIN 225 MG/1
225 CAPSULE ORAL 2 TIMES DAILY
Qty: 60 CAPSULE | Refills: 3 | Status: SHIPPED | OUTPATIENT
Start: 2017-09-18 | End: 2017-12-06 | Stop reason: SDUPTHER

## 2017-09-18 RX ORDER — OXYCODONE AND ACETAMINOPHEN 7.5; 325 MG/1; MG/1
1 TABLET ORAL 4 TIMES DAILY PRN
Qty: 120 TABLET | Refills: 0 | Status: SHIPPED | OUTPATIENT
Start: 2017-09-18 | End: 2017-11-07

## 2017-09-18 RX ORDER — RIZATRIPTAN BENZOATE 10 MG/1
10 TABLET ORAL ONCE AS NEEDED
Qty: 9 TABLET | Refills: 1 | Status: SHIPPED | OUTPATIENT
Start: 2017-09-18 | End: 2017-11-07 | Stop reason: SDUPTHER

## 2017-09-18 RX ORDER — BUSPIRONE HYDROCHLORIDE 5 MG/1
5 TABLET ORAL DAILY PRN
Qty: 30 TABLET | Refills: 0 | Status: SHIPPED | OUTPATIENT
Start: 2017-09-18

## 2017-09-18 RX ORDER — ATORVASTATIN CALCIUM 20 MG/1
20 TABLET, FILM COATED ORAL DAILY
Qty: 30 TABLET | Refills: 3 | Status: SHIPPED | OUTPATIENT
Start: 2017-09-18 | End: 2017-11-07 | Stop reason: SDUPTHER

## 2017-09-18 RX ORDER — CELECOXIB 200 MG/1
200 CAPSULE ORAL DAILY
Qty: 60 CAPSULE | Refills: 4 | Status: SHIPPED | OUTPATIENT
Start: 2017-09-18 | End: 2017-11-07 | Stop reason: SDUPTHER

## 2017-09-18 RX ORDER — CLONAZEPAM 1 MG/1
1 TABLET ORAL NIGHTLY PRN
Qty: 30 TABLET | Refills: 0 | Status: SHIPPED | OUTPATIENT
Start: 2017-09-18 | End: 2017-11-07 | Stop reason: SDUPTHER

## 2017-09-18 NOTE — PROGRESS NOTES
Subjective   Lauren Womack is a 57 y.o. female.   Pt is here to follow up on HPL,migraines,IBS,chronic back pain,fibromyalgia and insomnia.               History of Present Illness   Pt is here to follow up on HPL,migraines,IBS,chronic back pain,fibromyalgia and insomnia. Pt states that all chronic issues are doing well with no acute complaints.               The following portions of the patient's history were reviewed and updated as appropriate: allergies, current medications, past family history, past medical history, past social history, past surgical history and problem list.               Review of Systems   Constitutional: Negative.    HENT: Negative.    Eyes: Negative.    Respiratory: Negative.    Cardiovascular:        See HPI.    Gastrointestinal: Negative.    Endocrine: Negative.    Genitourinary: Negative.    Musculoskeletal:        See HPI.    Skin: Negative.    Allergic/Immunologic: Negative.    Neurological:        See HPI.    Hematological: Negative.    Psychiatric/Behavioral:        See HPI.                 Objective   Physical Exam   Constitutional: She is oriented to person, place, and time. She appears well-developed and well-nourished.   HENT:   Head: Normocephalic and atraumatic.   Right Ear: External ear normal.   Left Ear: External ear normal.   Nose: Nose normal.   Mouth/Throat: Oropharynx is clear and moist.   Eyes: Conjunctivae and EOM are normal. Pupils are equal, round, and reactive to light.   Neck: Normal range of motion. Neck supple. No tracheal deviation present. No thyromegaly present.   Cardiovascular: Normal rate, regular rhythm, normal heart sounds and intact distal pulses.    Pulmonary/Chest: Effort normal and breath sounds normal.   Abdominal: Soft. Bowel sounds are normal. She exhibits no distension. There is no tenderness.   Neurological: She is alert and oriented to person, place, and time. She has normal reflexes.   Skin: Skin is warm and dry.   Psychiatric: She has a  normal mood and affect.   Nursing note and vitals reviewed.            Assessment/Plan     Hyperlipidemia, unspecified hyperlipidemia type    Nonintractable migraine, unspecified migraine type  -     metoprolol tartrate (LOPRESSOR) 50 MG tablet; Take 1 tablet by mouth Daily.    Irritable bowel syndrome without diarrhea    Chronic low back pain without sciatica, unspecified back pain laterality    Fibromyalgia  -     celecoxib (CELEBREX) 200 MG capsule; Take 1 capsule by mouth Daily.  -     pregabalin (LYRICA) 225 MG capsule; Take 1 capsule by mouth 2 (Two) Times a Day.    Other insomnia  -     clonazePAM (KlonoPIN) 1 MG tablet; Take 1 tablet by mouth At Night As Needed for Seizures.    Anxiety  -     busPIRone (BUSPAR) 5 MG tablet; Take 1 tablet by mouth Daily As Needed (anxiety).    Other orders  -     rizatriptan (MAXALT) 10 MG tablet; Take 1 tablet by mouth 1 (One) Time As Needed for Migraine for up to 1 dose. May repeat in 2 hours if needed  -     oxyCODONE-acetaminophen (PERCOCET) 7.5-325 MG per tablet; Take 1 tablet by mouth 4 (Four) Times a Day As Needed for Moderate Pain .  -     atorvastatin (LIPITOR) 20 MG tablet; Take 1 tablet by mouth Daily.        1.) Follow up in 3 months   2.) Refill chronic meds   3.) UDS today

## 2017-09-27 ENCOUNTER — TELEPHONE (OUTPATIENT)
Dept: INTERNAL MEDICINE | Facility: CLINIC | Age: 57
End: 2017-09-27

## 2017-09-27 NOTE — TELEPHONE ENCOUNTER
I have no say on this, probably whoever she establishes with , I doubt any provider will refill without seeing as it is not like I am off on vacation and returning.

## 2017-09-27 NOTE — TELEPHONE ENCOUNTER
----- Message from Diana De Oliveira sent at 9/27/2017 12:25 PM EDT -----  PT CALLED WANTING TO KNOW WHO IS GOING TO BE WITTING HER PERCOCET WHEN DR MCMILLAN LEAVES? SHE WANTED TO KNOW IF DR MCMILLAN PLANS ON HAVING DR MARTINEZ WRITE THESE. SHE CAN BE REACHED [phone]

## 2017-11-07 ENCOUNTER — OFFICE VISIT (OUTPATIENT)
Dept: INTERNAL MEDICINE | Facility: CLINIC | Age: 57
End: 2017-11-07

## 2017-11-07 VITALS
RESPIRATION RATE: 18 BRPM | BODY MASS INDEX: 33.24 KG/M2 | WEIGHT: 187.6 LBS | TEMPERATURE: 97.5 F | DIASTOLIC BLOOD PRESSURE: 92 MMHG | HEIGHT: 63 IN | SYSTOLIC BLOOD PRESSURE: 130 MMHG | HEART RATE: 70 BPM | OXYGEN SATURATION: 99 %

## 2017-11-07 DIAGNOSIS — J01.90 ACUTE SINUSITIS, RECURRENCE NOT SPECIFIED, UNSPECIFIED LOCATION: ICD-10-CM

## 2017-11-07 DIAGNOSIS — Z79.899 HIGH RISK MEDICATION USE: ICD-10-CM

## 2017-11-07 DIAGNOSIS — R39.9 UTI SYMPTOMS: Primary | ICD-10-CM

## 2017-11-07 DIAGNOSIS — Z23 NEED FOR TDAP VACCINATION: ICD-10-CM

## 2017-11-07 DIAGNOSIS — E78.5 HYPERLIPIDEMIA, UNSPECIFIED HYPERLIPIDEMIA TYPE: ICD-10-CM

## 2017-11-07 DIAGNOSIS — M54.50 CHRONIC LOW BACK PAIN WITHOUT SCIATICA, UNSPECIFIED BACK PAIN LATERALITY: ICD-10-CM

## 2017-11-07 DIAGNOSIS — G43.909 MIGRAINE WITHOUT STATUS MIGRAINOSUS, NOT INTRACTABLE, UNSPECIFIED MIGRAINE TYPE: ICD-10-CM

## 2017-11-07 DIAGNOSIS — G47.09 OTHER INSOMNIA: ICD-10-CM

## 2017-11-07 DIAGNOSIS — G89.29 CHRONIC LOW BACK PAIN WITHOUT SCIATICA, UNSPECIFIED BACK PAIN LATERALITY: ICD-10-CM

## 2017-11-07 DIAGNOSIS — M79.7 FIBROMYALGIA: ICD-10-CM

## 2017-11-07 LAB
AMPHET+METHAMPHET UR QL: NEGATIVE
AMPHETAMINES UR QL: NEGATIVE
BARBITURATES UR QL SCN: NEGATIVE
BENZODIAZ UR QL SCN: NEGATIVE
BILIRUB BLD-MCNC: NEGATIVE MG/DL
BUPRENORPHINE SERPL-MCNC: NEGATIVE NG/ML
CANNABINOIDS SERPL QL: NEGATIVE
CLARITY, POC: CLEAR
COCAINE UR QL: NEGATIVE
COLOR UR: YELLOW
GLUCOSE UR STRIP-MCNC: NEGATIVE MG/DL
KETONES UR QL: NEGATIVE
LEUKOCYTE EST, POC: NEGATIVE
METHADONE UR QL SCN: NEGATIVE
NITRITE UR-MCNC: NEGATIVE MG/ML
OPIATES UR QL: NEGATIVE
OXYCODONE UR QL SCN: POSITIVE
PCP UR QL SCN: NEGATIVE
PH UR: 6 [PH] (ref 5–8)
PROPOXYPH UR QL: NEGATIVE
PROT UR STRIP-MCNC: NEGATIVE MG/DL
RBC # UR STRIP: NEGATIVE /UL
SP GR UR: 1.02 (ref 1–1.03)
TRICYCLICS UR QL SCN: NEGATIVE
UROBILINOGEN UR QL: NORMAL

## 2017-11-07 PROCEDURE — 90471 IMMUNIZATION ADMIN: CPT | Performed by: NURSE PRACTITIONER

## 2017-11-07 PROCEDURE — 80306 DRUG TEST PRSMV INSTRMNT: CPT | Performed by: NURSE PRACTITIONER

## 2017-11-07 PROCEDURE — 81003 URINALYSIS AUTO W/O SCOPE: CPT | Performed by: NURSE PRACTITIONER

## 2017-11-07 PROCEDURE — 90715 TDAP VACCINE 7 YRS/> IM: CPT | Performed by: NURSE PRACTITIONER

## 2017-11-07 PROCEDURE — 87086 URINE CULTURE/COLONY COUNT: CPT | Performed by: NURSE PRACTITIONER

## 2017-11-07 PROCEDURE — 99213 OFFICE O/P EST LOW 20 MIN: CPT | Performed by: NURSE PRACTITIONER

## 2017-11-07 RX ORDER — CELECOXIB 200 MG/1
200 CAPSULE ORAL DAILY
Qty: 90 CAPSULE | Refills: 1 | Status: SHIPPED | OUTPATIENT
Start: 2017-11-07 | End: 2017-12-06 | Stop reason: SDUPTHER

## 2017-11-07 RX ORDER — FLUCONAZOLE 150 MG/1
150 TABLET ORAL ONCE
Qty: 1 TABLET | Refills: 0 | Status: SHIPPED | OUTPATIENT
Start: 2017-11-07 | End: 2017-11-07

## 2017-11-07 RX ORDER — HYDROCODONE BITARTRATE AND ACETAMINOPHEN 7.5; 325 MG/1; MG/1
1 TABLET ORAL
Qty: 120 TABLET | Refills: 0 | Status: SHIPPED | OUTPATIENT
Start: 2017-11-07 | End: 2017-12-06

## 2017-11-07 RX ORDER — LEVOCETIRIZINE DIHYDROCHLORIDE 5 MG/1
5 TABLET, FILM COATED ORAL EVERY EVENING
Qty: 30 TABLET | Refills: 5 | Status: SHIPPED | OUTPATIENT
Start: 2017-11-07

## 2017-11-07 RX ORDER — SULFAMETHOXAZOLE AND TRIMETHOPRIM 800; 160 MG/1; MG/1
1 TABLET ORAL 2 TIMES DAILY
Qty: 20 TABLET | Refills: 0 | Status: SHIPPED | OUTPATIENT
Start: 2017-11-07 | End: 2017-12-06

## 2017-11-07 RX ORDER — CLONAZEPAM 1 MG/1
1 TABLET ORAL NIGHTLY PRN
Qty: 30 TABLET | Refills: 0 | Status: SHIPPED | OUTPATIENT
Start: 2017-11-07 | End: 2017-12-06

## 2017-11-07 RX ORDER — RIZATRIPTAN BENZOATE 10 MG/1
10 TABLET ORAL ONCE AS NEEDED
Qty: 9 TABLET | Refills: 5 | Status: SHIPPED | OUTPATIENT
Start: 2017-11-07 | End: 2017-12-06 | Stop reason: SDUPTHER

## 2017-11-07 RX ORDER — ATORVASTATIN CALCIUM 20 MG/1
20 TABLET, FILM COATED ORAL DAILY
Qty: 90 TABLET | Refills: 1 | Status: SHIPPED | OUTPATIENT
Start: 2017-11-07 | End: 2017-12-06 | Stop reason: SDUPTHER

## 2017-11-07 NOTE — PROGRESS NOTES
Subjective   Lauren Womack is a 57 y.o. female    Chief Complaint   Patient presents with   • Establish Care   • Urinary Tract Infection     urinary frequency and burning with urination   • Sinusitis     Headache, sinus pressure/pain, drianage, nasal congesiton   • Med Refill     History of Present Illness     New pt here to establish care.     Requests a Tdap today.  Son's wife is expecting a baby soon.      UTI sx's x 2 weeks.  C/O urinary urgency, frequency.  No blood in urine.  No f/c.  Reports that she has recurrent infections.  Has not tried any OTC meds.      Congestion x 3 weeks; C/O congestion and sinus pressure.  Ears feel full.  No cough or chest congestion.  No fever or chills.  Has tried several OTC meds without any relief of sx's.    HL - chronic/stable on Lipitor 20 mg without SE.      Chronic back pain/fibro - pt states that she has a long-standing h/o chronic LBP/DDD.  She had pain pump (morphine) inserted for several years, but this had to be removed secondary to lymphedema in 2010.  She was not given any pain meds after removal and went into terrible withdrawal sx's.  She was hospitalized per her report.  She has been on Percocet, Celebrex, Soma and Lyrica for years now.  She is not currently seeing pain management.    Migraines - chronic/stable on Maxalt PRN    Anxiety - chronic; takes Buspar PRN    HTN - chronic/stable on Lopressor without SE; does not monitor BP at home    IBS/GERD - sx's are flaring; taking Nexium 20mg daily and Lansoprazole PRN    Insomnia - chronic; currently taking Klonopin 1mg QHS and states that she still is not sleeping well.    Past Medical History:   Diagnosis Date   • Atopic rhinitis    • Chronic back pain    • Fibromyalgia    • Hordeolum externum    • Hyperlipemia    • IBS (irritable bowel syndrome)    • Insomnia    • Low back pain    • Migraine    • Shoulder pain      Past Surgical History:   Procedure Laterality Date   • ADENOIDECTOMY     • BLADDER SURGERY  2006     bladder stimulator - medtronic - last battery replaced in    • CARPAL TUNNEL RELEASE Right 2007   •  SECTION  1985   • COLONOSCOPY  2012   • HYSTERECTOMY  1991    JIMMIE; endometriosis, menorrhagia, non-cancerous reasons   • KNEE SURGERY Left     meniscus repair and baker's cyst removed   • PAIN PUMP INSERTION/REVISION      removed in    • TONSILLECTOMY       Allergies   Allergen Reactions   • Morphine And Related    • Zolmitriptan Palpitations     Family History   Problem Relation Age of Onset   • Arthritis Mother    • Hypertension Mother    • ALS Father    • Hemochromatosis Brother      1/2 brother - maternal   • Cancer Maternal Grandmother    • Emphysema Maternal Grandfather    • No Known Problems Son      Social History     Social History   • Marital status:      Spouse name: N/A   • Number of children: N/A   • Years of education: N/A     Occupational History   • Not on file.     Social History Main Topics   • Smoking status: Never Smoker   • Smokeless tobacco: Never Used   • Alcohol use No   • Drug use: No   • Sexual activity: Yes     Partners: Male      Comment:       Other Topics Concern   • Not on file     Social History Narrative           The following portions of the patient's history were reviewed and updated as appropriate: allergies, current medications, past family history, past medical history, past social history, past surgical history and problem list.    Current Outpatient Prescriptions:   •  atorvastatin (LIPITOR) 20 MG tablet, Take 1 tablet by mouth Daily., Disp: 90 tablet, Rfl: 1  •  busPIRone (BUSPAR) 5 MG tablet, Take 1 tablet by mouth Daily As Needed (anxiety)., Disp: 30 tablet, Rfl: 0  •  carisoprodol (SOMA) 250 MG tablet, Take 1 tablet by mouth Daily As Needed for Muscle Spasms., Disp: 30 tablet, Rfl: 0  •  celecoxib (CELEBREX) 200 MG capsule, Take 1 capsule by mouth Daily., Disp: 90 capsule, Rfl: 1  •  clonazePAM (KlonoPIN) 1 MG tablet, Take 1  tablet by mouth At Night As Needed for Seizures., Disp: 30 tablet, Rfl: 0  •  esomeprazole (NEXIUM) 20 MG capsule, Take 1 capsule by mouth daily., Disp: , Rfl:   •  estradiol (CLIMARA) 0.1 MG/24HR patch, Place 1 patch on the skin 1 (One) Time Per Week., Disp: , Rfl:   •  lansoprazole (PREVACID) 30 MG capsule, Take 1 capsule by mouth daily., Disp: , Rfl:   •  metoprolol tartrate (LOPRESSOR) 50 MG tablet, Take 1 tablet by mouth Daily., Disp: 30 tablet, Rfl: 3  •  pregabalin (LYRICA) 225 MG capsule, Take 1 capsule by mouth 2 (Two) Times a Day., Disp: 60 capsule, Rfl: 3  •  promethazine (PHENERGAN) 12.5 MG tablet, Take 1 tablet by mouth Every 8 (Eight) Hours As Needed for Nausea or Vomiting., Disp: 30 tablet, Rfl: 1  •  rizatriptan (MAXALT) 10 MG tablet, Take 1 tablet by mouth 1 (One) Time As Needed for Migraine for up to 1 dose. May repeat in 2 hours if needed, Disp: 9 tablet, Rfl: 5  •  sucralfate (CARAFATE) 1 GM/10ML suspension, Take 10 mL by mouth 4 (four) times a day., Disp: , Rfl:   •  Triamcinolone Acetonide (NASACORT AQ) 55 MCG/ACT nasal inhaler, 2 sprays into each nostril Daily., Disp: 1 bottle, Rfl: 5  •  vitamin E 600 UNIT capsule, Take 1 capsule by mouth daily., Disp: , Rfl:   •  HYDROcodone-acetaminophen (NORCO) 7.5-325 MG per tablet, Take 1 tablet by mouth 4 (Four) Times a Day Before Meals & at Bedtime As Needed for Moderate Pain ., Disp: 120 tablet, Rfl: 0  •  levocetirizine (XYZAL) 5 MG tablet, Take 1 tablet by mouth Every Evening., Disp: 30 tablet, Rfl: 5  •  sulfamethoxazole-trimethoprim (BACTRIM DS) 800-160 MG per tablet, Take 1 tablet by mouth 2 (Two) Times a Day., Disp: 20 tablet, Rfl: 0     Review of Systems   Constitutional: Negative for chills, fatigue and fever.   HENT: Positive for congestion, ear pain, postnasal drip, sinus pain and sinus pressure.    Respiratory: Negative for cough, chest tightness and shortness of breath.    Cardiovascular: Negative for chest pain.   Gastrointestinal:  "Negative for abdominal pain, diarrhea, nausea and vomiting.   Endocrine: Negative for cold intolerance and heat intolerance.   Genitourinary: Positive for frequency and urgency.   Musculoskeletal: Negative for arthralgias.   Neurological: Negative for dizziness.       Objective   Physical Exam   Constitutional: She is oriented to person, place, and time. She appears well-developed and well-nourished.   HENT:   Head: Normocephalic and atraumatic.   Eyes: Conjunctivae and EOM are normal. Pupils are equal, round, and reactive to light.   Neck: Normal range of motion.   Cardiovascular: Normal rate, regular rhythm and normal heart sounds.    Pulmonary/Chest: Effort normal and breath sounds normal.   Abdominal: Soft. Bowel sounds are normal.   Musculoskeletal: Normal range of motion.   Neurological: She is alert and oriented to person, place, and time. She has normal reflexes.   Skin: Skin is warm and dry.   Psychiatric: She has a normal mood and affect. Her behavior is normal. Judgment and thought content normal.     Vitals:    11/07/17 1454   BP: 130/92   Pulse: 70   Resp: 18   Temp: 97.5 °F (36.4 °C)   TempSrc: Temporal Artery    SpO2: 99%   Weight: 187 lb 9.6 oz (85.1 kg)   Height: 63\" (160 cm)         Assessment/Plan   Lauren was seen today for establish care, urinary tract infection, sinusitis and med refill.    Diagnoses and all orders for this visit:    UTI symptoms  -     POC Urinalysis Dipstick, Automated  -     Urine Culture - Urine, Urine, Clean Catch    Acute sinusitis, recurrence not specified, unspecified location    Hyperlipidemia, unspecified hyperlipidemia type  -     atorvastatin (LIPITOR) 20 MG tablet; Take 1 tablet by mouth Daily.    Migraine without status migrainosus, not intractable, unspecified migraine type  -     rizatriptan (MAXALT) 10 MG tablet; Take 1 tablet by mouth 1 (One) Time As Needed for Migraine for up to 1 dose. May repeat in 2 hours if needed    Chronic low back pain without sciatica, " unspecified back pain laterality  -     HYDROcodone-acetaminophen (NORCO) 7.5-325 MG per tablet; Take 1 tablet by mouth 4 (Four) Times a Day Before Meals & at Bedtime As Needed for Moderate Pain .  -     Urine Drug Screen - Urine, Clean Catch  -     Ambulatory Referral to Pain Management    Fibromyalgia  -     celecoxib (CELEBREX) 200 MG capsule; Take 1 capsule by mouth Daily.  -     Urine Drug Screen - Urine, Clean Catch  -     Ambulatory Referral to Pain Management    Other insomnia  -     clonazePAM (KlonoPIN) 1 MG tablet; Take 1 tablet by mouth At Night As Needed for Seizures.  -     Urine Drug Screen - Urine, Clean Catch    High risk medication use  -     HYDROcodone-acetaminophen (NORCO) 7.5-325 MG per tablet; Take 1 tablet by mouth 4 (Four) Times a Day Before Meals & at Bedtime As Needed for Moderate Pain .  -     Urine Drug Screen - Urine, Clean Catch  -     Ambulatory Referral to Pain Management    Need for Tdap vaccination  -     Tdap Vaccine Greater Than or Equal To 8yo IM    Other orders  -     sulfamethoxazole-trimethoprim (BACTRIM DS) 800-160 MG per tablet; Take 1 tablet by mouth 2 (Two) Times a Day.  -     fluconazole (DIFLUCAN) 150 MG tablet; Take 1 tablet by mouth 1 (One) Time for 1 dose.  -     levocetirizine (XYZAL) 5 MG tablet; Take 1 tablet by mouth Every Evening.      Bactrim for UTI and Sinusitis  Diflucan per request  Xyzal for better allergy control  Long discussion with Lauren about my concerns with the amount of controlled/addictive/sedating medication she is currently taking  I have referred her to pain management  I have suggested that she try and wean the Klonopin with the intent of switching to an actual sleep aid for sleep  We discussed that I cannot write for Percocet, but I will prescribe Hydrocodone with the understanding that once she sees pain management, we will no longer prescribe this in our office as we do not manage chronic pain.  Lipitor, Maxalt, Celebrex and Klonopin  refilled  She will increase her Nexium to 40mg daily and encourage to not take 2 PPIs in the same day, may use OTC Zantac PRN  Yvan fernández, UDS sent; I did not have her sign a CSC with me being as though she will be establishing with [facility].  Tdap updated per request - her son's wife is expecting a baby soon  F/U with [facility] in 1 month or [facility] sooner with any problems

## 2017-11-09 LAB — BACTERIA SPEC AEROBE CULT: NORMAL

## 2017-11-15 ENCOUNTER — TELEPHONE (OUTPATIENT)
Dept: INTERNAL MEDICINE | Facility: CLINIC | Age: 57
End: 2017-11-15

## 2017-11-15 NOTE — TELEPHONE ENCOUNTER
----- Message from PINEDA Jaquez sent at 11/13/2017  5:31 AM EST -----  UDS was + for her pain med, bu neg for klonopin.  When was her last dose prior to the UDS?  We will need her to come in for a pill count and repeat UDS today.

## 2017-11-15 NOTE — TELEPHONE ENCOUNTER
Left msg 2 times and sent patient a [facility] msg to call me when she got the msg to go over recent results!

## 2017-11-16 ENCOUNTER — TELEPHONE (OUTPATIENT)
Dept: INTERNAL MEDICINE | Facility: CLINIC | Age: 57
End: 2017-11-16

## 2017-11-16 DIAGNOSIS — Z79.899 ENCOUNTER FOR LONG-TERM (CURRENT) USE OF MEDICATIONS: Primary | ICD-10-CM

## 2017-11-16 NOTE — TELEPHONE ENCOUNTER
She must come in tomorrow so we can repeat, if not we will not be able to prescribe any other controlled meds

## 2017-11-16 NOTE — TELEPHONE ENCOUNTER
Patient notified! She said she would be in tomorrow. Do you want to go ahead and put in the future order for the UDS?

## 2017-11-16 NOTE — TELEPHONE ENCOUNTER
Patient finally called me back! She said she takes her Klonopin every night and would have taken it the night before she came in. She said it should have been in her System.     She said there is no way she can get here today. She has a migraine, she has been up all night and she has no ride and her  is out of town and will not be back until tomorrow.    She said she can come in tomorrow.

## 2017-11-17 ENCOUNTER — CLINICAL SUPPORT (OUTPATIENT)
Dept: INTERNAL MEDICINE | Facility: CLINIC | Age: 57
End: 2017-11-17

## 2017-11-17 ENCOUNTER — TELEPHONE (OUTPATIENT)
Dept: INTERNAL MEDICINE | Facility: CLINIC | Age: 57
End: 2017-11-17

## 2017-11-17 DIAGNOSIS — Z79.899 ENCOUNTER FOR LONG-TERM (CURRENT) USE OF MEDICATIONS: ICD-10-CM

## 2017-11-17 NOTE — TELEPHONE ENCOUNTER
----- Message from Valery Luna MA sent at 11/17/2017  2:01 PM EST -----  Patient present today for repeat drug screen and a pill count. She filled her pills on 11/7/17 and has 21 pills remaining today at her count.

## 2017-11-29 ENCOUNTER — TELEPHONE (OUTPATIENT)
Dept: INTERNAL MEDICINE | Facility: CLINIC | Age: 57
End: 2017-11-29

## 2017-11-29 NOTE — TELEPHONE ENCOUNTER
----- Message from PINEDA Jaquez sent at 11/28/2017  4:29 PM EST -----  PO drug screen is also negative for klonopin and soma.  I will not be able to write for any controls.

## 2017-12-06 ENCOUNTER — OFFICE VISIT (OUTPATIENT)
Dept: INTERNAL MEDICINE | Facility: CLINIC | Age: 57
End: 2017-12-06

## 2017-12-06 VITALS
OXYGEN SATURATION: 97 % | TEMPERATURE: 97.6 F | WEIGHT: 191.14 LBS | HEART RATE: 77 BPM | SYSTOLIC BLOOD PRESSURE: 124 MMHG | BODY MASS INDEX: 33.86 KG/M2 | DIASTOLIC BLOOD PRESSURE: 84 MMHG

## 2017-12-06 DIAGNOSIS — G43.909 MIGRAINE WITHOUT STATUS MIGRAINOSUS, NOT INTRACTABLE, UNSPECIFIED MIGRAINE TYPE: ICD-10-CM

## 2017-12-06 DIAGNOSIS — Z79.899 HIGH RISK MEDICATION USE: ICD-10-CM

## 2017-12-06 DIAGNOSIS — M54.50 CHRONIC LOW BACK PAIN WITHOUT SCIATICA, UNSPECIFIED BACK PAIN LATERALITY: ICD-10-CM

## 2017-12-06 DIAGNOSIS — G47.09 OTHER INSOMNIA: ICD-10-CM

## 2017-12-06 DIAGNOSIS — M79.7 FIBROMYALGIA: ICD-10-CM

## 2017-12-06 DIAGNOSIS — G89.29 CHRONIC LOW BACK PAIN WITHOUT SCIATICA, UNSPECIFIED BACK PAIN LATERALITY: ICD-10-CM

## 2017-12-06 DIAGNOSIS — E78.5 HYPERLIPIDEMIA, UNSPECIFIED HYPERLIPIDEMIA TYPE: ICD-10-CM

## 2017-12-06 DIAGNOSIS — Z12.31 ENCOUNTER FOR SCREENING MAMMOGRAM FOR MALIGNANT NEOPLASM OF BREAST: Primary | ICD-10-CM

## 2017-12-06 PROCEDURE — 99214 OFFICE O/P EST MOD 30 MIN: CPT | Performed by: FAMILY MEDICINE

## 2017-12-06 RX ORDER — RIZATRIPTAN BENZOATE 10 MG/1
10 TABLET ORAL ONCE AS NEEDED
Qty: 9 TABLET | Refills: 5 | Status: SHIPPED | OUTPATIENT
Start: 2017-12-06

## 2017-12-06 RX ORDER — HYDROCODONE BITARTRATE AND ACETAMINOPHEN 7.5; 325 MG/1; MG/1
1 TABLET ORAL
Qty: 120 TABLET | Refills: 0 | Status: CANCELLED | OUTPATIENT
Start: 2017-12-06

## 2017-12-06 RX ORDER — DOXEPIN HYDROCHLORIDE 3 MG/1
3 TABLET ORAL NIGHTLY
Qty: 90 TABLET | Refills: 0 | Status: SHIPPED | OUTPATIENT
Start: 2017-12-06 | End: 2017-12-07 | Stop reason: SDUPTHER

## 2017-12-06 RX ORDER — CLONAZEPAM 0.5 MG/1
0.5 TABLET ORAL NIGHTLY PRN
Qty: 20 TABLET | Refills: 0 | Status: SHIPPED | OUTPATIENT
Start: 2017-12-06

## 2017-12-06 RX ORDER — OXYCODONE HYDROCHLORIDE AND ACETAMINOPHEN 5; 325 MG/1; MG/1
1 TABLET ORAL EVERY 6 HOURS PRN
Qty: 120 TABLET | Refills: 0 | Status: SHIPPED | OUTPATIENT
Start: 2017-12-06 | End: 2018-01-03 | Stop reason: DRUGHIGH

## 2017-12-06 RX ORDER — CELECOXIB 200 MG/1
200 CAPSULE ORAL DAILY
Qty: 90 CAPSULE | Refills: 1 | Status: SHIPPED | OUTPATIENT
Start: 2017-12-06 | End: 2017-12-07

## 2017-12-06 RX ORDER — DICYCLOMINE HYDROCHLORIDE 10 MG/1
10 CAPSULE ORAL 3 TIMES DAILY PRN
Qty: 200 CAPSULE | Refills: 1 | Status: SHIPPED | OUTPATIENT
Start: 2017-12-06

## 2017-12-06 RX ORDER — PREGABALIN 225 MG/1
225 CAPSULE ORAL 2 TIMES DAILY
Qty: 60 CAPSULE | Refills: 3 | Status: SHIPPED | OUTPATIENT
Start: 2017-12-06 | End: 2018-01-03 | Stop reason: SDUPTHER

## 2017-12-06 RX ORDER — VITAMIN E 268 MG
400 CAPSULE ORAL DAILY
COMMUNITY

## 2017-12-06 RX ORDER — ATORVASTATIN CALCIUM 20 MG/1
20 TABLET, FILM COATED ORAL DAILY
Qty: 90 TABLET | Refills: 1 | Status: SHIPPED | OUTPATIENT
Start: 2017-12-06

## 2017-12-06 NOTE — PROGRESS NOTES
Subjective   Lauren Womack is a 57 y.o. female.     Chief Complaint   Patient presents with   • Med Management     patient was wanting to switch back to percocet; headaches with the norco   • Med Refill     patient requested bentyl 10mg up to three times daily as needed       Visit Vitals   • /84   • Pulse 77   • Temp 97.6 °F (36.4 °C)   • Wt 86.7 kg (191 lb 2.2 oz)   • SpO2 97%   • BMI 33.86 kg/m2       Headache    This is a recurrent problem. The current episode started more than 1 year ago. The problem occurs daily. The problem has been unchanged. The pain is located in the frontal region. The pain does not radiate. The pain quality is similar to prior headaches. Associated symptoms include back pain, insomnia and sinus pressure. Pertinent negatives include no abdominal pain, abnormal behavior, anorexia, blurred vision, coughing, dizziness, drainage, ear pain, eye pain, eye redness, eye watering, facial sweating, fever, hearing loss, loss of balance, muscle aches, nausea, neck pain, numbness, phonophobia, photophobia, rhinorrhea, scalp tenderness, seizures, sore throat, swollen glands, tingling, tinnitus, visual change, vomiting, weakness or weight loss. The symptoms are aggravated by bright light. She has tried oral narcotics and triptans for the symptoms. Her past medical history is significant for migraine headaches, obesity and sinus disease. There is no history of cancer, cluster headaches, hypertension, immunosuppression, pseudotumor cerebri, recent head traumas or TMJ.   Back Pain   This is a chronic problem. The current episode started more than 1 year ago. The problem occurs constantly. The problem is unchanged. The pain is present in the lumbar spine. The pain radiates to the right thigh. The pain is at a severity of 9/10. The pain is severe. The pain is the same all the time. The symptoms are aggravated by position and standing. Associated symptoms include headaches, leg pain and paresthesias.  Pertinent negatives include no abdominal pain, bladder incontinence, bowel incontinence, chest pain, dysuria, fever, numbness, paresis, pelvic pain, perianal numbness, tingling, weakness or weight loss. She has tried ice, heat and analgesics for the symptoms. The treatment provided mild relief.   Insomnia   This is a chronic problem. The current episode started more than 1 year ago. The problem occurs constantly. The problem has been unchanged. Associated symptoms include arthralgias, a change in bowel habit, diaphoresis, fatigue, headaches, joint swelling and myalgias. Pertinent negatives include no abdominal pain, anorexia, chest pain, chills, congestion, coughing, fever, nausea, neck pain, numbness, rash, sore throat, swollen glands, urinary symptoms, vertigo, visual change, vomiting or weakness. Nothing aggravates the symptoms. Treatments tried: clonazepam. The treatment provided moderate relief.     Pt establishing care in this office after previous PCP left area.  Pt was seen last month by APRN.  Pt has chronic back and neck pain and spasm.  Pt has hx of chronic narcotics for 6 years.  Pt has hx of fibromyalgia.  Pt does not sleep well.      Pt was on percocet 7.5 mg under Dr Aguilera.  This was changed from percocet to norco.    Original back insult was herniated disc. Pt has had 2 Neurosurgeons that declined to do surgery.   Pt has bladder pacemaker and cannot do MRI. Last Neurosurgeon was Dr Hu.     Pt was previously on pain pump.   Pt had lymphedema from the pain pump. Pt slept well and had good pain control from pain pump.  Pt does not sleep, history of not sleeping since 2010 when she had pain pump removed for lymphedema.        called the company that makes clonazepam and the drug will at times not show with high fluids or if it is more than 6 hours post dose.   Pt takes the clonazepam at 9pm and the drug screen was negative 3pm.     Pt has been having daily frontal headaches since not sleeping  well.     Pt has pre-ulcer in her stomach that worsens with stress.   Bentyl helps, carafate helps.     The following portions of the patient's history were reviewed and updated as appropriate: allergies, current medications, past family history, past medical history, past social history, past surgical history and problem list.    Past Medical History:   Diagnosis Date   • Atopic rhinitis    • Chronic back pain    • Fibromyalgia    • Hordeolum externum    • Hyperlipemia    • IBS (irritable bowel syndrome)    • Insomnia    • Low back pain    • Migraine    • Shoulder pain      Past Surgical History:   Procedure Laterality Date   • ADENOIDECTOMY     • BLADDER SURGERY      bladder stimulator - [employer] - last battery replaced in    • CARPAL TUNNEL RELEASE Right    •  SECTION     • COLONOSCOPY  2012   • HYSTERECTOMY      JIMMIE; endometriosis, menorrhagia, non-cancerous reasons   • KNEE SURGERY Left     meniscus repair and baker's cyst removed   • PAIN PUMP INSERTION/REVISION      removed in    • TONSILLECTOMY       Allergies   Allergen Reactions   • Morphine And Related    • Zolmitriptan Palpitations       Family History   Problem Relation Age of Onset   • Arthritis Mother    • Hypertension Mother    • ALS Father    • Hemochromatosis Brother      1/2 brother - maternal   • Cancer Maternal Grandmother    • Emphysema Maternal Grandfather    • No Known Problems Son        Review of Systems   Constitutional: Positive for diaphoresis and fatigue. Negative for chills, fever and weight loss.   HENT: Positive for postnasal drip and sinus pressure. Negative for congestion, ear pain, hearing loss, nosebleeds, rhinorrhea, sneezing, sore throat and tinnitus.    Eyes: Negative.  Negative for blurred vision, photophobia, pain, redness and itching.   Respiratory: Negative.  Negative for cough, shortness of breath and wheezing.    Cardiovascular: Negative.  Negative for chest pain and  palpitations.   Gastrointestinal: Positive for change in bowel habit and constipation. Negative for abdominal pain, anorexia, bowel incontinence, diarrhea, nausea and vomiting.   Endocrine: Negative.  Negative for cold intolerance and heat intolerance.   Genitourinary: Negative.  Negative for bladder incontinence, dysuria, frequency, hematuria, pelvic pain and urgency.   Musculoskeletal: Positive for arthralgias, back pain, joint swelling and myalgias. Negative for neck pain.   Skin: Negative.  Negative for color change and rash.   Allergic/Immunologic: Negative.  Negative for environmental allergies.   Neurological: Positive for headaches and paresthesias. Negative for dizziness, vertigo, tingling, seizures, syncope, weakness, light-headedness, numbness and loss of balance.   Hematological: Negative.  Negative for adenopathy. Does not bruise/bleed easily.   Psychiatric/Behavioral: Positive for sleep disturbance. Negative for dysphoric mood. The patient is nervous/anxious and has insomnia.        Objective   Physical Exam   Constitutional: She is oriented to person, place, and time. She appears well-developed.   HENT:   Head: Normocephalic.   Right Ear: External ear normal.   Left Ear: External ear normal.   Nose: Nose normal.   Mouth/Throat: Oropharynx is clear and moist.   Eyes: Conjunctivae and EOM are normal. Pupils are equal, round, and reactive to light.   Neck: Normal range of motion. Neck supple. Muscular tenderness present.       Cardiovascular: Normal rate and regular rhythm.    No murmur heard.  Pulmonary/Chest: Effort normal and breath sounds normal. No respiratory distress. She has no decreased breath sounds. She has no wheezes. She has no rhonchi. She has no rales. She exhibits no tenderness.   Abdominal: Soft. Bowel sounds are normal. There is no tenderness.   Musculoskeletal: Normal range of motion.        Cervical back: She exhibits tenderness and spasm.        Lumbar back: She exhibits tenderness  and spasm.   Neurological: She is alert and oriented to person, place, and time.   Skin: Skin is warm and dry.   Psychiatric: She has a normal mood and affect. Her behavior is normal.   Nursing note and vitals reviewed.      Assessment/Plan   Lauren was seen today for med management and med refill.    Diagnoses and all orders for this visit:    Encounter for screening mammogram for malignant neoplasm of breast  -     Mammo Screening Digital Tomosynthesis Bilateral With CAD; Future    Fibromyalgia  -     celecoxib (CELEBREX) 200 MG capsule; Take 1 capsule by mouth Daily.  -     pregabalin (LYRICA) 225 MG capsule; Take 1 capsule by mouth 2 (Two) Times a Day.    Hyperlipidemia, unspecified hyperlipidemia type  -     atorvastatin (LIPITOR) 20 MG tablet; Take 1 tablet by mouth Daily.  -     Comprehensive Metabolic Panel  -     Lipid Panel    Migraine without status migrainosus, not intractable, unspecified migraine type  -     rizatriptan (MAXALT) 10 MG tablet; Take 1 tablet by mouth 1 (One) Time As Needed for Migraine for up to 1 dose. May repeat in 2 hours if needed    Chronic low back pain without sciatica, unspecified back pain laterality  -     oxyCODONE-acetaminophen (PERCOCET) 5-325 MG per tablet; Take 1 tablet by mouth Every 6 (Six) Hours As Needed for Moderate Pain .    High risk medication use    Other insomnia  -     Doxepin HCl (SILENOR) 3 MG tablet; Take 3 mg by mouth Every Night.    Other orders  -     Cancel: HYDROcodone-acetaminophen (NORCO) 7.5-325 MG per tablet; Take 1 tablet by mouth 4 (Four) Times a Day Before Meals & at Bedtime As Needed for Moderate Pain .  -     estradiol (CLIMARA) 0.1 MG/24HR patch; Place 1 patch on the skin 2 (Two) Times a Week.  -     clonazePAM (KlonoPIN) 0.5 MG tablet; Take 1 tablet by mouth At Night As Needed for Anxiety. And gradually wean off  -     dicyclomine (BENTYL) 10 MG capsule; Take 1 capsule by mouth 3 (Three) Times a Day As Needed (stomach pain).      Will have pt  wean off of clonazepam and start silenor when it arrives.   Start with lower dose narcotic(percocet 5), increase if needed  D/c norco             Current Outpatient Prescriptions:   •  atorvastatin (LIPITOR) 20 MG tablet, Take 1 tablet by mouth Daily., Disp: 90 tablet, Rfl: 1  •  busPIRone (BUSPAR) 5 MG tablet, Take 1 tablet by mouth Daily As Needed (anxiety)., Disp: 30 tablet, Rfl: 0  •  carisoprodol (SOMA) 250 MG tablet, Take 1 tablet by mouth Daily As Needed for Muscle Spasms., Disp: 30 tablet, Rfl: 0  •  celecoxib (CELEBREX) 200 MG capsule, Take 1 capsule by mouth Daily., Disp: 90 capsule, Rfl: 1  •  esomeprazole (NEXIUM) 20 MG capsule, Take 1 capsule by mouth daily., Disp: , Rfl:   •  [START ON 12/7/2017] estradiol (CLIMARA) 0.1 MG/24HR patch, Place 1 patch on the skin 2 (Two) Times a Week., Disp: 8 patch, Rfl: 1  •  lansoprazole (PREVACID) 30 MG capsule, Take 1 capsule by mouth daily., Disp: , Rfl:   •  levocetirizine (XYZAL) 5 MG tablet, Take 1 tablet by mouth Every Evening., Disp: 30 tablet, Rfl: 5  •  metoprolol tartrate (LOPRESSOR) 50 MG tablet, Take 1 tablet by mouth Daily., Disp: 30 tablet, Rfl: 3  •  pregabalin (LYRICA) 225 MG capsule, Take 1 capsule by mouth 2 (Two) Times a Day., Disp: 60 capsule, Rfl: 3  •  promethazine (PHENERGAN) 12.5 MG tablet, Take 1 tablet by mouth Every 8 (Eight) Hours As Needed for Nausea or Vomiting., Disp: 30 tablet, Rfl: 1  •  rizatriptan (MAXALT) 10 MG tablet, Take 1 tablet by mouth 1 (One) Time As Needed for Migraine for up to 1 dose. May repeat in 2 hours if needed, Disp: 9 tablet, Rfl: 5  •  sucralfate (CARAFATE) 1 GM/10ML suspension, Take 10 mL by mouth 4 (four) times a day., Disp: , Rfl:   •  Triamcinolone Acetonide (NASACORT AQ) 55 MCG/ACT nasal inhaler, 2 sprays into each nostril Daily., Disp: 1 bottle, Rfl: 5  •  vitamin E 400 UNIT capsule, Take 400 Units by mouth Daily., Disp: , Rfl:   •  clonazePAM (KlonoPIN) 0.5 MG tablet, Take 1 tablet by mouth At Night As  Needed for Anxiety. And gradually wean off, Disp: 20 tablet, Rfl: 0  •  dicyclomine (BENTYL) 10 MG capsule, Take 1 capsule by mouth 3 (Three) Times a Day As Needed (stomach pain)., Disp: 200 capsule, Rfl: 1  •  Doxepin HCl (SILENOR) 3 MG tablet, Take 3 mg by mouth Every Night., Disp: 90 tablet, Rfl: 0  •  oxyCODONE-acetaminophen (PERCOCET) 5-325 MG per tablet, Take 1 tablet by mouth Every 6 (Six) Hours As Needed for Moderate Pain ., Disp: 120 tablet, Rfl: 0    Return in about 4 weeks (around 1/3/2018), or if symptoms worsen or fail to improve, for Recheck.

## 2017-12-07 ENCOUNTER — TELEPHONE (OUTPATIENT)
Dept: INTERNAL MEDICINE | Facility: CLINIC | Age: 57
End: 2017-12-07

## 2017-12-07 DIAGNOSIS — G47.09 OTHER INSOMNIA: ICD-10-CM

## 2017-12-07 RX ORDER — CELECOXIB 400 MG/1
400 CAPSULE ORAL DAILY
Qty: 90 CAPSULE | Refills: 0 | Status: SHIPPED | OUTPATIENT
Start: 2017-12-07 | End: 2017-12-08 | Stop reason: SDUPTHER

## 2017-12-07 RX ORDER — DOXEPIN HYDROCHLORIDE 3 MG/1
3 TABLET ORAL NIGHTLY
Qty: 30 TABLET | Refills: 3 | Status: SHIPPED | OUTPATIENT
Start: 2017-12-07 | End: 2018-01-03 | Stop reason: DRUGHIGH

## 2017-12-07 NOTE — TELEPHONE ENCOUNTER
celebrex sent to [facility]    silenor sent to [facility] pharmacy in [place] that does a discount price pt to call [phone]

## 2017-12-07 NOTE — TELEPHONE ENCOUNTER
CELEBREX  IIS CHEAPER THRU [facility]  SILENOR THEY NEED A COPAY CARD OR SOMETHING DIFFERENT PLEASE GIVE SPOUSE SERGO A CALL [phone]

## 2017-12-07 NOTE — TELEPHONE ENCOUNTER
Mr Womack called regarding the prices of both medications. They would like to know if alternative medications would be more affordable if appropriate. The higher dose generic celebrex is apparently less expensive.

## 2017-12-08 RX ORDER — CELECOXIB 400 MG/1
400 CAPSULE ORAL DAILY
Qty: 90 CAPSULE | Refills: 0 | Status: SHIPPED | OUTPATIENT
Start: 2017-12-08 | End: 2018-01-03 | Stop reason: SDUPTHER

## 2017-12-24 ENCOUNTER — TELEPHONE (OUTPATIENT)
Dept: URGENT CARE | Facility: CLINIC | Age: 57
End: 2017-12-24

## 2017-12-24 DIAGNOSIS — Z20.828 EXPOSURE TO INFLUENZA: Primary | ICD-10-CM

## 2017-12-24 RX ORDER — OSELTAMIVIR PHOSPHATE 75 MG/1
75 CAPSULE ORAL DAILY
Qty: 10 CAPSULE | Refills: 0 | Status: SHIPPED | OUTPATIENT
Start: 2017-12-24 | End: 2018-02-05 | Stop reason: SDUPTHER

## 2018-01-03 ENCOUNTER — OFFICE VISIT (OUTPATIENT)
Dept: INTERNAL MEDICINE | Facility: CLINIC | Age: 58
End: 2018-01-03

## 2018-01-03 VITALS
SYSTOLIC BLOOD PRESSURE: 134 MMHG | OXYGEN SATURATION: 99 % | TEMPERATURE: 96.5 F | WEIGHT: 188.6 LBS | HEART RATE: 67 BPM | DIASTOLIC BLOOD PRESSURE: 88 MMHG | BODY MASS INDEX: 33.41 KG/M2

## 2018-01-03 DIAGNOSIS — R42 DIZZY: ICD-10-CM

## 2018-01-03 DIAGNOSIS — M79.7 FIBROMYALGIA: ICD-10-CM

## 2018-01-03 DIAGNOSIS — M54.50 CHRONIC LOW BACK PAIN WITHOUT SCIATICA, UNSPECIFIED BACK PAIN LATERALITY: ICD-10-CM

## 2018-01-03 DIAGNOSIS — G47.09 OTHER INSOMNIA: Primary | ICD-10-CM

## 2018-01-03 DIAGNOSIS — G89.29 CHRONIC LOW BACK PAIN WITHOUT SCIATICA, UNSPECIFIED BACK PAIN LATERALITY: ICD-10-CM

## 2018-01-03 PROCEDURE — 99214 OFFICE O/P EST MOD 30 MIN: CPT | Performed by: FAMILY MEDICINE

## 2018-01-03 RX ORDER — PREGABALIN 225 MG/1
225 CAPSULE ORAL 2 TIMES DAILY
Qty: 60 CAPSULE | Refills: 3 | Status: SHIPPED | OUTPATIENT
Start: 2018-01-03

## 2018-01-03 RX ORDER — OXYCODONE HYDROCHLORIDE AND ACETAMINOPHEN 5; 325 MG/1; MG/1
1 TABLET ORAL EVERY 6 HOURS PRN
Qty: 120 TABLET | Refills: 0 | Status: CANCELLED | OUTPATIENT
Start: 2018-01-03

## 2018-01-03 RX ORDER — OXYCODONE AND ACETAMINOPHEN 7.5; 325 MG/1; MG/1
1 TABLET ORAL EVERY 6 HOURS PRN
Qty: 120 TABLET | Refills: 0 | Status: SHIPPED | OUTPATIENT
Start: 2018-01-03

## 2018-01-03 RX ORDER — CARISOPRODOL 250 MG/1
1 TABLET ORAL DAILY PRN
Qty: 30 TABLET | Refills: 1 | Status: SHIPPED | OUTPATIENT
Start: 2018-01-03

## 2018-01-03 RX ORDER — DOXEPIN HYDROCHLORIDE 10 MG/1
10 CAPSULE ORAL NIGHTLY
Qty: 30 CAPSULE | Refills: 1 | Status: SHIPPED | OUTPATIENT
Start: 2018-01-03

## 2018-01-03 RX ORDER — CELECOXIB 400 MG/1
400 CAPSULE ORAL DAILY
Qty: 90 CAPSULE | Refills: 0 | Status: SHIPPED | OUTPATIENT
Start: 2018-01-03

## 2018-01-03 RX ORDER — MECLIZINE HYDROCHLORIDE 25 MG/1
25 TABLET ORAL 3 TIMES DAILY PRN
Qty: 30 TABLET | Refills: 1 | Status: SHIPPED | OUTPATIENT
Start: 2018-01-03

## 2018-01-03 NOTE — PROGRESS NOTES
Subjective   Lauren Womack is a 57 y.o. female.     Chief Complaint   Patient presents with   • Med Management     4 week follow up   • Insomnia     new medication did not work   • Back Pain     patient reports medication does not last long enough       Visit Vitals   • /88 (BP Location: Left arm, Patient Position: Sitting)   • Pulse 67   • Temp 96.5 °F (35.8 °C)   • Wt 85.5 kg (188 lb 9.6 oz)   • SpO2 99%   • BMI 33.41 kg/m2       Insomnia   This is a chronic problem. The current episode started more than 1 year ago. The problem occurs constantly. The problem has been unchanged. Associated symptoms include congestion, fatigue, headaches, numbness and vertigo. Pertinent negatives include no abdominal pain, anorexia, arthralgias, change in bowel habit, chest pain, chills, coughing, diaphoresis, fever, joint swelling, myalgias, nausea, neck pain, rash, sore throat, swollen glands, urinary symptoms, visual change, vomiting or weakness. Nothing aggravates the symptoms. Treatments tried: silenor. The treatment provided no relief.   Back Pain   This is a chronic problem. The current episode started more than 1 year ago. The problem occurs constantly. The problem is unchanged. The pain is present in the lumbar spine. The quality of the pain is described as stabbing. The pain radiates to the right thigh and right knee. Associated symptoms include headaches, leg pain, numbness and paresthesias. Pertinent negatives include no abdominal pain, bladder incontinence, bowel incontinence, chest pain, dysuria, fever, paresis, pelvic pain, perianal numbness, tingling, weakness or weight loss. She has tried analgesics, muscle relaxant and NSAIDs for the symptoms. The treatment provided moderate relief.      Pt has not slept well for years since having pain pump removed.   Pt is having migraines because she cannot sleep.   Silenor 3 mg did not help per sleep.   Pt states that the 5 mg percocet is only lasting about 4  hours.    The following portions of the patient's history were reviewed and updated as appropriate: allergies, current medications, past family history, past medical history, past social history, past surgical history and problem list.    Past Medical History:   Diagnosis Date   • Atopic rhinitis    • Chronic back pain    • Fibromyalgia    • Hordeolum externum    • Hyperlipemia    • IBS (irritable bowel syndrome)    • Insomnia    • Low back pain    • Migraine    • Shoulder pain      Past Surgical History:   Procedure Laterality Date   • ADENOIDECTOMY     • BLADDER SURGERY      bladder stimulator - [employer] - last battery replaced in    • CARPAL TUNNEL RELEASE Right    •  SECTION     • COLONOSCOPY  2012   • HYSTERECTOMY      JIMMIE; endometriosis, menorrhagia, non-cancerous reasons   • KNEE SURGERY Left     meniscus repair and baker's cyst removed   • PAIN PUMP INSERTION/REVISION      removed in    • TONSILLECTOMY       Allergies   Allergen Reactions   • Morphine And Related    • Zolmitriptan Palpitations     Family History   Problem Relation Age of Onset   • Arthritis Mother    • Hypertension Mother    • ALS Father    • Hemochromatosis Brother      1/2 brother - maternal   • Cancer Maternal Grandmother    • Emphysema Maternal Grandfather    • No Known Problems Son        Social History     Social History   • Marital status:      Spouse name: N/A   • Number of children: N/A   • Years of education: N/A     Occupational History   • Not on file.     Social History Main Topics   • Smoking status: Never Smoker   • Smokeless tobacco: Never Used   • Alcohol use No   • Drug use: No   • Sexual activity: Yes     Partners: Male      Comment:       Other Topics Concern   • Not on file     Social History Narrative       Review of Systems   Constitutional: Positive for fatigue. Negative for chills, diaphoresis, fever and weight loss.   HENT: Positive for congestion, postnasal  drip, rhinorrhea and sneezing. Negative for ear pain, nosebleeds, sinus pressure and sore throat.    Eyes: Negative.  Negative for redness and itching.   Respiratory: Negative.  Negative for cough, shortness of breath and wheezing.    Cardiovascular: Negative.  Negative for chest pain and palpitations.   Gastrointestinal: Negative.  Negative for abdominal pain, anorexia, bowel incontinence, change in bowel habit, constipation, diarrhea, nausea and vomiting.   Endocrine: Negative.  Negative for cold intolerance and heat intolerance.   Genitourinary: Negative.  Negative for bladder incontinence, dysuria, frequency, hematuria, pelvic pain and urgency.   Musculoskeletal: Positive for back pain. Negative for arthralgias, joint swelling, myalgias and neck pain.   Skin: Negative.  Negative for color change and rash.   Allergic/Immunologic: Negative.  Negative for environmental allergies.   Neurological: Positive for dizziness, vertigo, numbness, headaches and paresthesias. Negative for tingling, syncope, weakness and light-headedness.   Hematological: Negative.  Negative for adenopathy. Does not bruise/bleed easily.   Psychiatric/Behavioral: Positive for sleep disturbance. Negative for dysphoric mood. The patient has insomnia. The patient is not nervous/anxious.        Objective   Physical Exam   Constitutional: She is oriented to person, place, and time. She appears well-developed.   HENT:   Head: Normocephalic.   Right Ear: External ear and ear canal normal. Tympanic membrane is scarred.   Left Ear: External ear and ear canal normal. Tympanic membrane is scarred.   Nose: Rhinorrhea present. Right sinus exhibits maxillary sinus tenderness. Right sinus exhibits no frontal sinus tenderness. Left sinus exhibits maxillary sinus tenderness. Left sinus exhibits no frontal sinus tenderness.   Mouth/Throat: Uvula is midline, oropharynx is clear and moist and mucous membranes are normal. No oropharyngeal exudate, posterior  oropharyngeal edema or posterior oropharyngeal erythema.   Eyes: Conjunctivae and EOM are normal. Pupils are equal, round, and reactive to light.   Neck: Normal range of motion. Neck supple.   Cardiovascular: Normal rate and regular rhythm.    No murmur heard.  Pulmonary/Chest: Effort normal and breath sounds normal.   Abdominal: Soft. Bowel sounds are normal.   Musculoskeletal: Normal range of motion. She exhibits tenderness.        Lumbar back: She exhibits tenderness and spasm.   Neurological: She is alert and oriented to person, place, and time.   Skin: Skin is warm and dry.   Psychiatric: She has a normal mood and affect. Her behavior is normal.   Nursing note and vitals reviewed.      Assessment/Plan   Lauren was seen today for med management, insomnia and back pain.    Diagnoses and all orders for this visit:    Other insomnia    Fibromyalgia  -     pregabalin (LYRICA) 225 MG capsule; Take 1 capsule by mouth 2 (Two) Times a Day.    Chronic low back pain without sciatica, unspecified back pain laterality  -     celecoxib (CELEBREX) 400 MG capsule; Take 1 capsule by mouth Daily.  -     carisoprodol (SOMA) 250 MG tablet; Take 1 tablet by mouth Daily As Needed for Muscle Spasms.  -     oxyCODONE-acetaminophen (PERCOCET) 7.5-325 MG per tablet; Take 1 tablet by mouth Every 6 (Six) Hours As Needed for Moderate Pain .    Dizzy    Other orders  -     Cancel: oxyCODONE-acetaminophen (PERCOCET) 5-325 MG per tablet; Take 1 tablet by mouth Every 6 (Six) Hours As Needed for Moderate Pain .  -     doxepin (SINEquan) 10 MG capsule; Take 1 capsule by mouth Every Night.  -     meclizine (ANTIVERT) 25 MG tablet; Take 1 tablet by mouth 3 (Three) Times a Day As Needed for dizziness.        Increase percocet 7.5/325 1 qid  Increase doxepin to 10 mg at hs, pt advised she can increase doxepin to 20 mg  Handout on vertigo           Current Outpatient Prescriptions:   •  atorvastatin (LIPITOR) 20 MG tablet, Take 1 tablet by mouth  Daily., Disp: 90 tablet, Rfl: 1  •  busPIRone (BUSPAR) 5 MG tablet, Take 1 tablet by mouth Daily As Needed (anxiety)., Disp: 30 tablet, Rfl: 0  •  carisoprodol (SOMA) 250 MG tablet, Take 1 tablet by mouth Daily As Needed for Muscle Spasms., Disp: 30 tablet, Rfl: 1  •  celecoxib (CELEBREX) 400 MG capsule, Take 1 capsule by mouth Daily., Disp: 90 capsule, Rfl: 0  •  clonazePAM (KlonoPIN) 0.5 MG tablet, Take 1 tablet by mouth At Night As Needed for Anxiety. And gradually wean off, Disp: 20 tablet, Rfl: 0  •  dicyclomine (BENTYL) 10 MG capsule, Take 1 capsule by mouth 3 (Three) Times a Day As Needed (stomach pain)., Disp: 200 capsule, Rfl: 1  •  esomeprazole (NEXIUM) 20 MG capsule, Take 1 capsule by mouth daily., Disp: , Rfl:   •  estradiol (CLIMARA) 0.1 MG/24HR patch, Place 1 patch on the skin 2 (Two) Times a Week., Disp: 8 patch, Rfl: 1  •  lansoprazole (PREVACID) 30 MG capsule, Take 1 capsule by mouth daily., Disp: , Rfl:   •  levocetirizine (XYZAL) 5 MG tablet, Take 1 tablet by mouth Every Evening., Disp: 30 tablet, Rfl: 5  •  metoprolol tartrate (LOPRESSOR) 50 MG tablet, Take 1 tablet by mouth Daily., Disp: 30 tablet, Rfl: 3  •  oseltamivir (TAMIFLU) 75 MG capsule, Take 1 capsule by mouth Daily., Disp: 10 capsule, Rfl: 0  •  pregabalin (LYRICA) 225 MG capsule, Take 1 capsule by mouth 2 (Two) Times a Day., Disp: 60 capsule, Rfl: 3  •  promethazine (PHENERGAN) 12.5 MG tablet, Take 1 tablet by mouth Every 8 (Eight) Hours As Needed for Nausea or Vomiting., Disp: 30 tablet, Rfl: 1  •  rizatriptan (MAXALT) 10 MG tablet, Take 1 tablet by mouth 1 (One) Time As Needed for Migraine for up to 1 dose. May repeat in 2 hours if needed, Disp: 9 tablet, Rfl: 5  •  sucralfate (CARAFATE) 1 GM/10ML suspension, Take 10 mL by mouth 4 (four) times a day., Disp: , Rfl:   •  Triamcinolone Acetonide (NASACORT AQ) 55 MCG/ACT nasal inhaler, 2 sprays into each nostril Daily., Disp: 1 bottle, Rfl: 5  •  vitamin E 400 UNIT capsule, Take 400  Units by mouth Daily., Disp: , Rfl:   •  doxepin (SINEquan) 10 MG capsule, Take 1 capsule by mouth Every Night., Disp: 30 capsule, Rfl: 1  •  meclizine (ANTIVERT) 25 MG tablet, Take 1 tablet by mouth 3 (Three) Times a Day As Needed for dizziness., Disp: 30 tablet, Rfl: 1  •  oxyCODONE-acetaminophen (PERCOCET) 7.5-325 MG per tablet, Take 1 tablet by mouth Every 6 (Six) Hours As Needed for Moderate Pain ., Disp: 120 tablet, Rfl: 0    Return in about 4 weeks (around 1/31/2018), or if symptoms worsen or fail to improve, for Recheck.

## 2018-01-03 NOTE — PATIENT INSTRUCTIONS
Benign Positional Vertigo  Vertigo is the feeling that you or your surroundings are moving when they are not. Benign positional vertigo is the most common form of vertigo. The cause of this condition is not serious (is benign). This condition is triggered by certain movements and positions (is positional). This condition can be dangerous if it occurs while you are doing something that could endanger you or others, such as driving.   CAUSES  In many cases, the cause of this condition is not known. It may be caused by a disturbance in an area of the inner ear that helps your brain to sense movement and balance. This disturbance can be caused by a viral infection (labyrinthitis), head injury, or repetitive motion.  RISK FACTORS  This condition is more likely to develop in:  · Women.  · People who are 50 years of age or older.  SYMPTOMS  Symptoms of this condition usually happen when you move your head or your eyes in different directions. Symptoms may start suddenly, and they usually last for less than a minute. Symptoms may include:  · Loss of balance and falling.  · Feeling like you are spinning or moving.  · Feeling like your surroundings are spinning or moving.  · Nausea and vomiting.  · Blurred vision.  · Dizziness.  · Involuntary eye movement (nystagmus).  Symptoms can be mild and cause only slight annoyance, or they can be severe and interfere with daily life. Episodes of benign positional vertigo may return (recur) over time, and they may be triggered by certain movements. Symptoms may improve over time.  DIAGNOSIS  This condition is usually diagnosed by medical history and a physical exam of the head, neck, and ears. You may be referred to a health care provider who specializes in ear, nose, and throat (ENT) problems (otolaryngologist) or a provider who specializes in disorders of the nervous system (neurologist). You may have additional testing, including:  · MRI.  · A CT scan.  · Eye movement tests. Your  "health care provider may ask you to change positions quickly while he or she watches you for symptoms of benign positional vertigo, such as nystagmus. Eye movement may be tested with an electronystagmogram (ENG), caloric stimulation, the [place] test, or the roll test.  · An electroencephalogram (EEG). This records electrical activity in your brain.  · Hearing tests.  TREATMENT  Usually, your health care provider will treat this by moving your head in specific positions to adjust your inner ear back to normal. Surgery may be needed in severe cases, but this is rare. In some cases, benign positional vertigo may resolve on its own in 2-4 weeks.  HOME CARE INSTRUCTIONS  Safety  · Move slowly. Avoid sudden body or head movements.  · Avoid driving.  · Avoid operating heavy machinery.  · Avoid doing any tasks that would be dangerous to you or others if a vertigo episode would occur.  · If you have trouble walking or keeping your balance, try using a cane for stability. If you feel dizzy or unstable, sit down right away.  · Return to your normal activities as told by your health care provider. Ask your health care provider what activities are safe for you.  General Instructions  · Take over-the-counter and prescription medicines only as told by your health care provider.  · Avoid certain positions or movements as told by your health care provider.  · Drink enough fluid to keep your urine clear or pale yellow.  · Keep all follow-up visits as told by your health care provider. This is important.  SEEK MEDICAL CARE IF:  · You have a fever.  · Your condition gets worse or you develop new symptoms.  · Your family or friends notice any behavioral changes.  · Your nausea or vomiting gets worse.  · You have numbness or a \"pins and needles\" sensation.  SEEK IMMEDIATE MEDICAL CARE IF:  · You have difficulty speaking or moving.  · You are always dizzy.  · You faint.  · You develop severe headaches.  · You have weakness in your " legs or arms.  · You have changes in your hearing or vision.  · You develop a stiff neck.  · You develop sensitivity to light.     This information is not intended to replace advice given to you by your health care provider. Make sure you discuss any questions you have with your health care provider.     Document Released: 09/25/2007 Document Revised: 09/07/2016 Document Reviewed: 04/11/2016  [employer] Patient Education ©2017 [employer]

## 2018-01-04 ENCOUNTER — TELEPHONE (OUTPATIENT)
Dept: INTERNAL MEDICINE | Facility: CLINIC | Age: 58
End: 2018-01-04

## 2018-01-04 RX ORDER — AZITHROMYCIN 250 MG/1
TABLET, FILM COATED ORAL
Qty: 6 TABLET | Refills: 0 | Status: SHIPPED | OUTPATIENT
Start: 2018-01-04

## 2018-01-04 RX ORDER — GUAIFENESIN, PSEUDOEPHEDRINE HYDROCHLORIDE 600; 60 MG/1; MG/1
1 TABLET, EXTENDED RELEASE ORAL EVERY 12 HOURS
Qty: 20 TABLET | Refills: 0 | Status: SHIPPED | OUTPATIENT
Start: 2018-01-04

## 2018-01-04 NOTE — TELEPHONE ENCOUNTER
PT CALLED SHE HAS BEEN SICK ALL NIGHT CAN SHE HAVE ANTIBIOTIC CALLED IN AND SHE GOT A LETTER REGARDING A PAIN CLINIC SHE IS CONFUSED DID DR SOUTH WANT TO TO SEE PAIN MANAGEMENT

## 2018-01-04 NOTE — TELEPHONE ENCOUNTER
Just when patient came home she started with the terrible sinus pressure. She's complaining of colored discharge. She said the providor had felt her sinuses yesterday at her visit when it was just starting.     Also, she wondered if Dr Alexis would like her to see the pain management referral that was set up. If not, she will cancel the initial appt with them. It is more expensive for her to go through pain management.

## 2018-02-05 ENCOUNTER — TELEPHONE (OUTPATIENT)
Dept: INTERNAL MEDICINE | Facility: CLINIC | Age: 58
End: 2018-02-05

## 2018-02-05 DIAGNOSIS — Z20.828 EXPOSURE TO INFLUENZA: ICD-10-CM

## 2018-02-05 RX ORDER — OSELTAMIVIR PHOSPHATE 75 MG/1
75 CAPSULE ORAL DAILY
Qty: 10 CAPSULE | Refills: 0 | Status: SHIPPED | OUTPATIENT
Start: 2018-02-05